# Patient Record
Sex: FEMALE | Race: WHITE | Employment: FULL TIME | URBAN - METROPOLITAN AREA
[De-identification: names, ages, dates, MRNs, and addresses within clinical notes are randomized per-mention and may not be internally consistent; named-entity substitution may affect disease eponyms.]

---

## 2017-01-03 PROBLEM — R01.1 FLOW MURMUR: Chronic | Status: ACTIVE | Noted: 2017-01-03

## 2017-01-03 PROBLEM — Z85.3 HISTORY OF CANCER OF RIGHT BREAST: Chronic | Status: ACTIVE | Noted: 2017-01-03

## 2017-01-03 PROBLEM — I10 ESSENTIAL HYPERTENSION: Chronic | Status: ACTIVE | Noted: 2017-01-03

## 2017-03-14 ENCOUNTER — APPOINTMENT (OUTPATIENT)
Dept: CT IMAGING | Age: 62
End: 2017-03-14
Attending: EMERGENCY MEDICINE
Payer: COMMERCIAL

## 2017-03-14 ENCOUNTER — HOSPITAL ENCOUNTER (EMERGENCY)
Age: 62
Discharge: HOME | End: 2017-03-14
Attending: EMERGENCY MEDICINE
Payer: COMMERCIAL

## 2017-03-14 ENCOUNTER — APPOINTMENT (OUTPATIENT)
Dept: GENERAL RADIOLOGY | Age: 62
End: 2017-03-14
Attending: EMERGENCY MEDICINE
Payer: COMMERCIAL

## 2017-03-14 VITALS
TEMPERATURE: 98 F | HEART RATE: 95 BPM | HEIGHT: 65 IN | WEIGHT: 249 LBS | RESPIRATION RATE: 14 BRPM | SYSTOLIC BLOOD PRESSURE: 125 MMHG | BODY MASS INDEX: 41.48 KG/M2 | DIASTOLIC BLOOD PRESSURE: 74 MMHG | OXYGEN SATURATION: 100 %

## 2017-03-14 DIAGNOSIS — I47.1 SUPRAVENTRICULAR TACHYCARDIA (HCC): Primary | ICD-10-CM

## 2017-03-14 LAB
ALBUMIN SERPL BCP-MCNC: 3.7 G/DL (ref 3.2–4.6)
ALBUMIN/GLOB SERPL: 0.9 {RATIO} (ref 1.2–3.5)
ALP SERPL-CCNC: 122 U/L (ref 50–136)
ALT SERPL-CCNC: 27 U/L (ref 12–65)
ANION GAP BLD CALC-SCNC: 11 MMOL/L (ref 7–16)
AST SERPL W P-5'-P-CCNC: 35 U/L (ref 15–37)
ATRIAL RATE: 145 BPM
BASOPHILS # BLD AUTO: 0 K/UL (ref 0–0.2)
BASOPHILS # BLD: 0 % (ref 0–2)
BILIRUB SERPL-MCNC: 0.4 MG/DL (ref 0.2–1.1)
BUN SERPL-MCNC: 20 MG/DL (ref 8–23)
CALCIUM SERPL-MCNC: 8.7 MG/DL (ref 8.3–10.4)
CALCULATED P AXIS, ECG09: 56 DEGREES
CALCULATED R AXIS, ECG10: 62 DEGREES
CALCULATED T AXIS, ECG11: 24 DEGREES
CHLORIDE SERPL-SCNC: 109 MMOL/L (ref 98–107)
CO2 SERPL-SCNC: 24 MMOL/L (ref 21–32)
CREAT SERPL-MCNC: 1.15 MG/DL (ref 0.6–1)
D DIMER PPP FEU-MCNC: 0.67 UG/ML(FEU)
DIAGNOSIS, 93000: NORMAL
DIASTOLIC BP, ECG02: NORMAL MMHG
DIFFERENTIAL METHOD BLD: ABNORMAL
EOSINOPHIL # BLD: 0.5 K/UL (ref 0–0.8)
EOSINOPHIL NFR BLD: 5 % (ref 0.5–7.8)
ERYTHROCYTE [DISTWIDTH] IN BLOOD BY AUTOMATED COUNT: 13.9 % (ref 11.9–14.6)
GLOBULIN SER CALC-MCNC: 4 G/DL (ref 2.3–3.5)
GLUCOSE SERPL-MCNC: 137 MG/DL (ref 65–100)
HCT VFR BLD AUTO: 40.1 % (ref 35.8–46.3)
HGB BLD-MCNC: 13.4 G/DL (ref 11.7–15.4)
IMM GRANULOCYTES # BLD: 0 K/UL (ref 0–0.5)
IMM GRANULOCYTES NFR BLD AUTO: 0.1 % (ref 0–5)
LYMPHOCYTES # BLD AUTO: 43 % (ref 13–44)
LYMPHOCYTES # BLD: 4.6 K/UL (ref 0.5–4.6)
MCH RBC QN AUTO: 30.6 PG (ref 26.1–32.9)
MCHC RBC AUTO-ENTMCNC: 33.4 G/DL (ref 31.4–35)
MCV RBC AUTO: 91.6 FL (ref 79.6–97.8)
MONOCYTES # BLD: 0.8 K/UL (ref 0.1–1.3)
MONOCYTES NFR BLD AUTO: 7 % (ref 4–12)
NEUTS SEG # BLD: 4.8 K/UL (ref 1.7–8.2)
NEUTS SEG NFR BLD AUTO: 45 % (ref 43–78)
P-R INTERVAL, ECG05: 116 MS
PLATELET # BLD AUTO: 254 K/UL (ref 150–450)
PMV BLD AUTO: 10.5 FL (ref 10.8–14.1)
POTASSIUM SERPL-SCNC: 4 MMOL/L (ref 3.5–5.1)
PROT SERPL-MCNC: 7.7 G/DL (ref 6.3–8.2)
Q-T INTERVAL, ECG07: 326 MS
QRS DURATION, ECG06: 74 MS
QTC CALCULATION (BEZET), ECG08: 506 MS
RBC # BLD AUTO: 4.38 M/UL (ref 4.05–5.25)
SODIUM SERPL-SCNC: 144 MMOL/L (ref 136–145)
SYSTOLIC BP, ECG01: NORMAL MMHG
TROPONIN I SERPL-MCNC: <0.02 NG/ML (ref 0.02–0.05)
VENTRICULAR RATE, ECG03: 145 BPM
WBC # BLD AUTO: 10.7 K/UL (ref 4.3–11.1)

## 2017-03-14 PROCEDURE — 96374 THER/PROPH/DIAG INJ IV PUSH: CPT | Performed by: EMERGENCY MEDICINE

## 2017-03-14 PROCEDURE — 71010 XR CHEST PORT: CPT

## 2017-03-14 PROCEDURE — 84484 ASSAY OF TROPONIN QUANT: CPT | Performed by: EMERGENCY MEDICINE

## 2017-03-14 PROCEDURE — 74011636320 HC RX REV CODE- 636/320: Performed by: EMERGENCY MEDICINE

## 2017-03-14 PROCEDURE — 71260 CT THORAX DX C+: CPT

## 2017-03-14 PROCEDURE — 99284 EMERGENCY DEPT VISIT MOD MDM: CPT | Performed by: EMERGENCY MEDICINE

## 2017-03-14 PROCEDURE — 96361 HYDRATE IV INFUSION ADD-ON: CPT | Performed by: EMERGENCY MEDICINE

## 2017-03-14 PROCEDURE — 74011000258 HC RX REV CODE- 258: Performed by: EMERGENCY MEDICINE

## 2017-03-14 PROCEDURE — 93005 ELECTROCARDIOGRAM TRACING: CPT | Performed by: EMERGENCY MEDICINE

## 2017-03-14 PROCEDURE — 74011250636 HC RX REV CODE- 250/636: Performed by: EMERGENCY MEDICINE

## 2017-03-14 PROCEDURE — 85379 FIBRIN DEGRADATION QUANT: CPT | Performed by: EMERGENCY MEDICINE

## 2017-03-14 PROCEDURE — 80053 COMPREHEN METABOLIC PANEL: CPT | Performed by: EMERGENCY MEDICINE

## 2017-03-14 PROCEDURE — 74011000250 HC RX REV CODE- 250: Performed by: EMERGENCY MEDICINE

## 2017-03-14 PROCEDURE — 85025 COMPLETE CBC W/AUTO DIFF WBC: CPT | Performed by: EMERGENCY MEDICINE

## 2017-03-14 RX ORDER — SODIUM CHLORIDE 0.9 % (FLUSH) 0.9 %
5-10 SYRINGE (ML) INJECTION EVERY 8 HOURS
Status: DISCONTINUED | OUTPATIENT
Start: 2017-03-14 | End: 2017-03-14 | Stop reason: HOSPADM

## 2017-03-14 RX ORDER — SODIUM CHLORIDE 0.9 % (FLUSH) 0.9 %
10 SYRINGE (ML) INJECTION
Status: COMPLETED | OUTPATIENT
Start: 2017-03-14 | End: 2017-03-14

## 2017-03-14 RX ORDER — SODIUM CHLORIDE 0.9 % (FLUSH) 0.9 %
5-10 SYRINGE (ML) INJECTION AS NEEDED
Status: DISCONTINUED | OUTPATIENT
Start: 2017-03-14 | End: 2017-03-14 | Stop reason: HOSPADM

## 2017-03-14 RX ORDER — METOPROLOL TARTRATE 5 MG/5ML
5 INJECTION INTRAVENOUS ONCE
Status: COMPLETED | OUTPATIENT
Start: 2017-03-14 | End: 2017-03-14

## 2017-03-14 RX ADMIN — Medication 5 ML: at 15:43

## 2017-03-14 RX ADMIN — SODIUM CHLORIDE 1000 ML: 900 INJECTION, SOLUTION INTRAVENOUS at 16:33

## 2017-03-14 RX ADMIN — SODIUM CHLORIDE 100 ML: 900 INJECTION, SOLUTION INTRAVENOUS at 18:20

## 2017-03-14 RX ADMIN — METOPROLOL TARTRATE 5 MG: 1 INJECTION, SOLUTION INTRAVENOUS at 16:33

## 2017-03-14 RX ADMIN — IOVERSOL 100 ML: 741 INJECTION INTRA-ARTERIAL; INTRAVENOUS at 18:20

## 2017-03-14 RX ADMIN — Medication 10 ML: at 18:21

## 2017-03-14 NOTE — ED TRIAGE NOTES
Pt. Presented to the wellness clinic with generalized body aches. While there, the np states the pt. HR went from 90 to 150. Pt. States she had an episode similar last year with a viral illness of which she temporarily went into afib.  States she did not see cardiology at that time but went to her family MD.

## 2017-03-14 NOTE — DISCHARGE INSTRUCTIONS
Return with any fevers, vomiting, difficulty breathing, worsening symptoms, or additional concerns. As we discussed, I think he should restart your metoprolol.     Follow-up with your primary care doctor for reevaluation in 2 or 3 days

## 2017-03-20 PROBLEM — R00.0 SINUS TACHYCARDIA: Chronic | Status: ACTIVE | Noted: 2017-03-20

## 2017-06-13 ENCOUNTER — HOSPITAL ENCOUNTER (OUTPATIENT)
Dept: MAMMOGRAPHY | Age: 62
Discharge: HOME | End: 2017-06-13
Attending: FAMILY MEDICINE
Payer: COMMERCIAL

## 2017-06-13 DIAGNOSIS — Z12.31 VISIT FOR SCREENING MAMMOGRAM: ICD-10-CM

## 2017-06-13 PROCEDURE — 77067 SCR MAMMO BI INCL CAD: CPT

## 2017-07-26 PROBLEM — R73.02 GLUCOSE INTOLERANCE (IMPAIRED GLUCOSE TOLERANCE): Status: ACTIVE | Noted: 2017-07-26

## 2017-08-03 PROBLEM — R73.02 GLUCOSE INTOLERANCE (IMPAIRED GLUCOSE TOLERANCE): Chronic | Status: ACTIVE | Noted: 2017-07-26

## 2018-02-07 PROBLEM — K21.9 GASTROESOPHAGEAL REFLUX DISEASE: Chronic | Status: ACTIVE | Noted: 2018-02-07

## 2018-08-07 PROBLEM — E66.01 SEVERE OBESITY (BMI >= 40) (HCC): Chronic | Status: ACTIVE | Noted: 2018-08-07

## 2018-08-21 ENCOUNTER — HOSPITAL ENCOUNTER (OUTPATIENT)
Dept: MAMMOGRAPHY | Age: 63
Discharge: HOME | End: 2018-08-21
Attending: FAMILY MEDICINE
Payer: COMMERCIAL

## 2018-08-21 DIAGNOSIS — Z12.31 VISIT FOR SCREENING MAMMOGRAM: ICD-10-CM

## 2018-08-21 PROCEDURE — 77067 SCR MAMMO BI INCL CAD: CPT

## 2020-07-17 ENCOUNTER — EMPLOYEE WELLNESS (OUTPATIENT)
Dept: OTHER | Facility: CLINIC | Age: 65
End: 2020-07-17

## 2021-08-12 NOTE — ED PROVIDER NOTES
HPI Comments: 63-year-old lady with a history of occasional sinus tachycardia who comes in with concerns about an irregular heartbeat earlier today and some body aches. Patient said she felt like she had gotten the flu. Patient said all this started suddenly this afternoon. She denied any previous fevers or chills and said yesterday she was feeling fine. Patient said she hasn't had a problem with tachycardia since December. She denies any vomiting or diarrhea. Patient said she had no significant difficulty breathing and no significant chest pain with these episodes. Patient is a 63 y.o. female presenting with general illness and palpitations. The history is provided by the patient. Generalized Body Aches   Pertinent negatives include no chest pain, no abdominal pain, no headaches and no shortness of breath. Palpitations    Pertinent negatives include no diaphoresis, no fever, no chest pain, no abdominal pain, no nausea, no vomiting, no headaches, no dizziness, no weakness, no cough and no shortness of breath. Past Medical History:   Diagnosis Date    Breast cancer ([facility] [address] 2000    right    Gastrointestinal disorder     Hypertension     Radiation therapy complication 6218       Past Surgical History:   Procedure Laterality Date    HX BREAST LUMPECTOMY Right 2000    radiation     HX CHOLECYSTECTOMY  1985    HX LUMBAR LAMINECTOMY      HX NICCI AND BSO      age 23    HX TYMPANOSTOMY  2013         Family History:   Problem Relation Age of Onset    Breast Cancer Paternal Grandmother 79       Social History     Social History    Marital status:      Spouse name: N/A    Number of children: N/A    Years of education: N/A     Occupational History    Not on file.      Social History Main Topics    Smoking status: Never Smoker    Smokeless tobacco: Never Used    Alcohol use No    Drug use: No    Sexual activity: Not on file     Other Topics Concern    Not on file     Social History Narrative         ALLERGIES: Aspirin    Review of Systems   Constitutional: Negative for chills, diaphoresis and fever. HENT: Negative for congestion, rhinorrhea and sore throat. Eyes: Negative for redness and visual disturbance. Respiratory: Negative for cough, chest tightness, shortness of breath and wheezing. Cardiovascular: Positive for palpitations. Negative for chest pain. Gastrointestinal: Negative for abdominal pain, blood in stool, diarrhea, nausea and vomiting. Endocrine: Negative for polydipsia and polyuria. Genitourinary: Negative for dysuria and hematuria. Musculoskeletal: Positive for myalgias. Negative for arthralgias and neck stiffness. Skin: Negative for rash. Allergic/Immunologic: Negative for environmental allergies and food allergies. Neurological: Negative for dizziness, weakness and headaches. Hematological: Negative for adenopathy. Does not bruise/bleed easily. Psychiatric/Behavioral: Negative for confusion and sleep disturbance. The patient is not nervous/anxious. Vitals:    03/14/17 1503 03/14/17 1744   BP: 151/84 123/59   Pulse: (!) 144 93   Resp: 16 13   Temp: 97.8 °F (36.6 °C)    SpO2: 100% 100%   Weight: 112.9 kg (249 lb)    Height: 5' 5\" (1.651 m)             Physical Exam   Constitutional: She is oriented to person, place, and time. She appears well-developed and well-nourished. HENT:   Head: Normocephalic and atraumatic. Eyes: Conjunctivae and EOM are normal. Pupils are equal, round, and reactive to light. Neck: Normal range of motion. Cardiovascular: Regular rhythm. Sinus tach in the 140s   Pulmonary/Chest: Effort normal and breath sounds normal. No respiratory distress. She has no wheezes. She has no rales. She exhibits no tenderness. Abdominal: Soft. Bowel sounds are normal. There is no rebound and no guarding. Musculoskeletal: Normal range of motion. She exhibits no edema or tenderness.    Lymphadenopathy:     She has no cervical adenopathy. Neurological: She is alert and oriented to person, place, and time. Skin: Skin is warm and dry. Psychiatric: She has a normal mood and affect. Nursing note and vitals reviewed. MDM  Number of Diagnoses or Management Options  Diagnosis management comments: EKG showed sinus tachycardia. Chest x-ray was essentially unremarkable. Her troponin was negative but her d-dimer was positive I will get a CTA of her chest to look for PE. CTA was negative. Patient received a prescription for metoprolol in December she has not been taking it. I will encourage her to restart that metoprolol.     ED Course       Procedures no

## 2022-03-18 PROBLEM — R00.0 SINUS TACHYCARDIA: Status: ACTIVE | Noted: 2017-03-20

## 2022-03-18 PROBLEM — R73.02 GLUCOSE INTOLERANCE (IMPAIRED GLUCOSE TOLERANCE): Status: ACTIVE | Noted: 2017-07-26

## 2022-03-19 PROBLEM — I10 ESSENTIAL HYPERTENSION: Status: ACTIVE | Noted: 2017-01-03

## 2022-03-19 PROBLEM — R01.1 FLOW MURMUR: Status: ACTIVE | Noted: 2017-01-03

## 2022-03-19 PROBLEM — K21.9 GASTROESOPHAGEAL REFLUX DISEASE: Status: ACTIVE | Noted: 2018-02-07

## 2022-03-20 PROBLEM — E66.01 SEVERE OBESITY (BMI >= 40) (HCC): Status: ACTIVE | Noted: 2018-08-07

## 2022-03-20 PROBLEM — Z85.3 HISTORY OF CANCER OF RIGHT BREAST: Status: ACTIVE | Noted: 2017-01-03

## 2022-08-23 LAB
CHOLEST SERPL-MCNC: 132 MG/DL
GLUCOSE SERPL-MCNC: 98 MG/DL (ref 65–100)
HDLC SERPL-MCNC: 40 MG/DL (ref 40–60)
LDLC SERPL CALC-MCNC: 72.4 MG/DL
TRIGL SERPL-MCNC: 98 MG/DL (ref 35–150)

## 2022-11-02 ENCOUNTER — TELEPHONE (OUTPATIENT)
Dept: FAMILY MEDICINE CLINIC | Facility: CLINIC | Age: 67
End: 2022-11-02

## 2022-11-02 DIAGNOSIS — K21.9 GASTROESOPHAGEAL REFLUX DISEASE, UNSPECIFIED WHETHER ESOPHAGITIS PRESENT: Primary | ICD-10-CM

## 2022-11-02 RX ORDER — LANSOPRAZOLE 30 MG/1
30 CAPSULE, DELAYED RELEASE ORAL
Qty: 90 CAPSULE | Refills: 3 | Status: SHIPPED | OUTPATIENT
Start: 2022-11-02 | End: 2023-01-31

## 2022-11-02 NOTE — TELEPHONE ENCOUNTER
Patient needs 30 days on the lansoprazole to [facility] mail order to cover until her NTP appt

## 2022-11-17 SDOH — HEALTH STABILITY: PHYSICAL HEALTH: ON AVERAGE, HOW MANY MINUTES DO YOU ENGAGE IN EXERCISE AT THIS LEVEL?: 30 MIN

## 2022-11-17 SDOH — HEALTH STABILITY: PHYSICAL HEALTH: ON AVERAGE, HOW MANY DAYS PER WEEK DO YOU ENGAGE IN MODERATE TO STRENUOUS EXERCISE (LIKE A BRISK WALK)?: 3 DAYS

## 2022-11-18 ENCOUNTER — OFFICE VISIT (OUTPATIENT)
Dept: FAMILY MEDICINE CLINIC | Facility: CLINIC | Age: 67
End: 2022-11-18
Payer: COMMERCIAL

## 2022-11-18 VITALS
OXYGEN SATURATION: 99 % | SYSTOLIC BLOOD PRESSURE: 125 MMHG | TEMPERATURE: 98.2 F | HEART RATE: 87 BPM | WEIGHT: 267.4 LBS | HEIGHT: 65 IN | BODY MASS INDEX: 44.55 KG/M2 | DIASTOLIC BLOOD PRESSURE: 85 MMHG

## 2022-11-18 DIAGNOSIS — E66.01 SEVERE OBESITY (BMI >= 40) (HCC): ICD-10-CM

## 2022-11-18 DIAGNOSIS — I10 ESSENTIAL (PRIMARY) HYPERTENSION: ICD-10-CM

## 2022-11-18 DIAGNOSIS — Z13.820 SCREENING FOR OSTEOPOROSIS: ICD-10-CM

## 2022-11-18 DIAGNOSIS — R73.03 PREDIABETES: ICD-10-CM

## 2022-11-18 DIAGNOSIS — R00.0 TACHYCARDIA: ICD-10-CM

## 2022-11-18 DIAGNOSIS — I48.91 ATRIAL FIBRILLATION, UNSPECIFIED TYPE (HCC): Primary | ICD-10-CM

## 2022-11-18 DIAGNOSIS — Z85.3 HISTORY OF CANCER OF RIGHT BREAST: ICD-10-CM

## 2022-11-18 DIAGNOSIS — K21.9 GASTROESOPHAGEAL REFLUX DISEASE WITHOUT ESOPHAGITIS: ICD-10-CM

## 2022-11-18 DIAGNOSIS — Z78.0 MENOPAUSE: ICD-10-CM

## 2022-11-18 PROBLEM — Z12.31 ENCOUNTER FOR SCREENING MAMMOGRAM FOR MALIGNANT NEOPLASM OF BREAST: Status: ACTIVE | Noted: 2022-11-18

## 2022-11-18 LAB
ALBUMIN SERPL-MCNC: 4.2 G/DL (ref 3.2–4.6)
ALBUMIN/GLOB SERPL: 1.2 {RATIO} (ref 0.4–1.6)
ALP SERPL-CCNC: 110 U/L (ref 50–136)
ALT SERPL-CCNC: 31 U/L (ref 12–65)
ANION GAP SERPL CALC-SCNC: 4 MMOL/L (ref 2–11)
AST SERPL-CCNC: 18 U/L (ref 15–37)
BASOPHILS # BLD: 0.1 K/UL (ref 0–0.2)
BASOPHILS NFR BLD: 1 % (ref 0–2)
BILIRUB SERPL-MCNC: 0.5 MG/DL (ref 0.2–1.1)
BUN SERPL-MCNC: 27 MG/DL (ref 8–23)
CALCIUM SERPL-MCNC: 9.6 MG/DL (ref 8.3–10.4)
CHLORIDE SERPL-SCNC: 111 MMOL/L (ref 101–110)
CO2 SERPL-SCNC: 25 MMOL/L (ref 21–32)
CREAT SERPL-MCNC: 1.2 MG/DL (ref 0.6–1)
DIFFERENTIAL METHOD BLD: NORMAL
EOSINOPHIL # BLD: 0.1 K/UL (ref 0–0.8)
EOSINOPHIL NFR BLD: 2 % (ref 0.5–7.8)
ERYTHROCYTE [DISTWIDTH] IN BLOOD BY AUTOMATED COUNT: 13.4 % (ref 11.9–14.6)
EST. AVERAGE GLUCOSE BLD GHB EST-MCNC: 114 MG/DL
GLOBULIN SER CALC-MCNC: 3.4 G/DL (ref 2.8–4.5)
GLUCOSE SERPL-MCNC: 133 MG/DL (ref 65–100)
HBA1C MFR BLD: 5.6 % (ref 4.8–5.6)
HCT VFR BLD AUTO: 42.7 % (ref 35.8–46.3)
HGB BLD-MCNC: 13.6 G/DL (ref 11.7–15.4)
IMM GRANULOCYTES # BLD AUTO: 0 K/UL (ref 0–0.5)
IMM GRANULOCYTES NFR BLD AUTO: 0 % (ref 0–5)
LYMPHOCYTES # BLD: 1.9 K/UL (ref 0.5–4.6)
LYMPHOCYTES NFR BLD: 27 % (ref 13–44)
MCH RBC QN AUTO: 30.5 PG (ref 26.1–32.9)
MCHC RBC AUTO-ENTMCNC: 31.9 G/DL (ref 31.4–35)
MCV RBC AUTO: 95.7 FL (ref 82–102)
MONOCYTES # BLD: 0.6 K/UL (ref 0.1–1.3)
MONOCYTES NFR BLD: 8 % (ref 4–12)
NEUTS SEG # BLD: 4.4 K/UL (ref 1.7–8.2)
NEUTS SEG NFR BLD: 62 % (ref 43–78)
NRBC # BLD: 0 K/UL (ref 0–0.2)
PLATELET # BLD AUTO: 209 K/UL (ref 150–450)
PMV BLD AUTO: 10.8 FL (ref 9.4–12.3)
POTASSIUM SERPL-SCNC: 4 MMOL/L (ref 3.5–5.1)
PROT SERPL-MCNC: 7.6 G/DL (ref 6.3–8.2)
RBC # BLD AUTO: 4.46 M/UL (ref 4.05–5.2)
SODIUM SERPL-SCNC: 140 MMOL/L (ref 133–143)
WBC # BLD AUTO: 7.1 K/UL (ref 4.3–11.1)

## 2022-11-18 PROCEDURE — 99215 OFFICE O/P EST HI 40 MIN: CPT | Performed by: FAMILY MEDICINE

## 2022-11-18 PROCEDURE — 93000 ELECTROCARDIOGRAM COMPLETE: CPT | Performed by: FAMILY MEDICINE

## 2022-11-18 PROCEDURE — 3078F DIAST BP <80 MM HG: CPT | Performed by: FAMILY MEDICINE

## 2022-11-18 PROCEDURE — 1123F ACP DISCUSS/DSCN MKR DOCD: CPT | Performed by: FAMILY MEDICINE

## 2022-11-18 PROCEDURE — 3074F SYST BP LT 130 MM HG: CPT | Performed by: FAMILY MEDICINE

## 2022-11-18 RX ORDER — HYDROCHLOROTHIAZIDE 12.5 MG/1
12.5 TABLET ORAL DAILY
Qty: 90 TABLET | Refills: 1 | Status: SHIPPED | OUTPATIENT
Start: 2022-11-18

## 2022-11-18 RX ORDER — AMLODIPINE AND VALSARTAN 10; 160 MG/1; MG/1
1 TABLET ORAL DAILY
Qty: 90 TABLET | Refills: 1 | Status: SHIPPED | OUTPATIENT
Start: 2022-11-18

## 2022-11-18 RX ORDER — METOPROLOL SUCCINATE 50 MG/1
50 TABLET, EXTENDED RELEASE ORAL DAILY
Qty: 90 TABLET | Refills: 1 | Status: SHIPPED | OUTPATIENT
Start: 2022-11-18

## 2022-11-18 ASSESSMENT — ENCOUNTER SYMPTOMS
DIARRHEA: 0
NAUSEA: 0
BLOOD IN STOOL: 0
COUGH: 0
SHORTNESS OF BREATH: 0
CHEST TIGHTNESS: 0
ABDOMINAL PAIN: 0
ABDOMINAL DISTENTION: 0
CONSTIPATION: 0
BACK PAIN: 0
VOMITING: 0

## 2022-11-18 ASSESSMENT — PATIENT HEALTH QUESTIONNAIRE - PHQ9
SUM OF ALL RESPONSES TO PHQ QUESTIONS 1-9: 0
1. LITTLE INTEREST OR PLEASURE IN DOING THINGS: 0
2. FEELING DOWN, DEPRESSED OR HOPELESS: 0
SUM OF ALL RESPONSES TO PHQ QUESTIONS 1-9: 0
SUM OF ALL RESPONSES TO PHQ9 QUESTIONS 1 & 2: 0

## 2022-11-18 ASSESSMENT — ANXIETY QUESTIONNAIRES
3. WORRYING TOO MUCH ABOUT DIFFERENT THINGS: 0
1. FEELING NERVOUS, ANXIOUS, OR ON EDGE: 0
5. BEING SO RESTLESS THAT IT IS HARD TO SIT STILL: 0
IF YOU CHECKED OFF ANY PROBLEMS ON THIS QUESTIONNAIRE, HOW DIFFICULT HAVE THESE PROBLEMS MADE IT FOR YOU TO DO YOUR WORK, TAKE CARE OF THINGS AT HOME, OR GET ALONG WITH OTHER PEOPLE: NOT DIFFICULT AT ALL
6. BECOMING EASILY ANNOYED OR IRRITABLE: 0
GAD7 TOTAL SCORE: 0
2. NOT BEING ABLE TO STOP OR CONTROL WORRYING: 0
7. FEELING AFRAID AS IF SOMETHING AWFUL MIGHT HAPPEN: 0
4. TROUBLE RELAXING: 0

## 2022-11-18 NOTE — PROGRESS NOTES
[facility]  Alecia Andre  Phone [phone]  Fax:  [phone]    Patient: Amilcar Abrams  YOB: 1955  Patient Age 74 y.o. Patient sex: female  Medical Record:  [id number]  Visit Date: 11/18/22    Family Practice Clinic Note     Chief Complaint   Patient presents with    Medication Refill       History of Present Illness:  Amilcar Abrams is a 74 y.o. female who presents today for annual follow-up of hypertension, which is currrently elevated. Normally about 120/70 at  home. Always elevated here. 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 she denies CP, JOHNSON/SOB, lower extremity edema, dizziness, syncopal episodes, tobacco use, regular ETOH use, hx of MI or CVA. She has a history of SVT- monitors HR at home, which is normally around 80. Hysterectomy at age 23 - had polycystic ovaries. Had Zack/bso early. Had HRT till 2000 when developed breast cancer. Was hormone related. Does try and eat Ca rich foods. Mostly diary. Breast cancer - R breast lumpectomy - [place] survivorship [place].  It is thought that her cancer was caused by her estrogen therapy after hysterectomy at age 23. She is off all meds now. Overdue for mammogram.  I did discuss with her the need to get her screening done yearly. It is almost 2 years now since she had her last mammogram.  Grandmother with brca but at age [de-identified]. Obesity - diet is good - trying to limit portions and carbs. Has lost some wt per pt. NO exercise. Discussed need to walk    GERD-she is taking Prevacid and that seems to help control her acid reflux. She does take it daily. Medication Refill  Pertinent negatives include no abdominal pain, chest pain, coughing, headaches, myalgias, nausea or vomiting. Allergies:   Allergies   Allergen Reactions    Aspirin Other (See Comments)     abd pain       Current Medications:   Medications marked \"taking\" at this time:    Current Outpatient Medications:     amLODIPine-valsartan (EXFORGE)  MG per tablet, Take 1 tablet by mouth daily, Disp: 90 tablet, Rfl: 1    hydroCHLOROthiazide (HYDRODIURIL) 12.5 MG tablet, Take 1 tablet by mouth daily, Disp: 90 tablet, Rfl: 1    metoprolol succinate (TOPROL XL) 50 MG extended release tablet, Take 1 tablet by mouth daily, Disp: 90 tablet, Rfl: 1    apixaban (ELIQUIS) 5 MG TABS tablet, Take 1 tablet by mouth 2 times daily, Disp: 180 tablet, Rfl: 1    lansoprazole (PREVACID) 30 MG delayed release capsule, Take 1 capsule by mouth every morning (before breakfast), Disp: 90 capsule, Rfl: 3    Current Problem List:   Patient Active Problem List   Diagnosis    Glucose intolerance (impaired glucose tolerance)    Tachycardia    Essential (primary) hypertension    Flow murmur    BMI 40.0-44.9, adult (HCC)    Gastroesophageal reflux disease    Severe obesity (BMI >= 40) (HCC)    History of cancer of right breast    Encounter for screening mammogram for malignant neoplasm of breast    Menopause    Prediabetes    Atrial fibrillation (HCC)       Social History:   Social History     Tobacco Use    Smoking status: Never    Smokeless tobacco: Never   Substance Use Topics    Alcohol use: No       Family History:   Family History   Problem Relation Age of Onset    Breast Cancer Paternal Grandmother 79    Hypertension Father     No Known Problems Mother     No Known Problems Paternal Grandfather     No Known Problems Maternal Grandfather     No Known Problems Maternal Grandmother        Surgical History:  Past Surgical History:   Procedure Laterality Date    BREAST LUMPECTOMY Right 2000    radiation     CHOLECYSTECTOMY  1985    LUMBAR LAMINECTOMY      NICKY AND BSO (CERVIX REMOVED)      age 23    TYMPANOSTOMY TUBE PLACEMENT  2013       ROS  Review of Systems   Constitutional: Negative. HENT: Negative. Respiratory:  Negative for cough, chest tightness and shortness of breath.     Cardiovascular: Negative for chest pain, palpitations and leg swelling. Gastrointestinal:  Negative for abdominal distention, abdominal pain, blood in stool, constipation, diarrhea, nausea and vomiting. Endocrine: Negative for polyuria. Genitourinary:  Negative for dysuria, frequency, hematuria and urgency. Musculoskeletal:  Negative for back pain, gait problem and myalgias. Skin: Negative. Allergic/Immunologic: Negative for environmental allergies. Neurological:  Negative for dizziness and headaches. Hematological: Negative. Psychiatric/Behavioral:  Negative for behavioral problems and sleep disturbance. Visit Vitals  /85   Pulse 87   Temp 98.2 °F (36.8 °C)   Ht 5' 5\" (1.651 m)   Wt 267 lb 6.4 oz (121.3 kg)   SpO2 99%   BMI 44.50 kg/m²     125/85    Physical Exam  Physical Exam  Constitutional:       Appearance: Normal appearance. She is normal weight. HENT:      Head: Normocephalic and atraumatic. Right Ear: Tympanic membrane normal.      Left Ear: Tympanic membrane normal.      Nose: Nose normal.   Eyes:      Pupils: Pupils are equal, round, and reactive to light. Neck:      Vascular: No carotid bruit. Cardiovascular:      Rate and Rhythm: Tachycardia present. Rhythm irregular. Heart sounds: Normal heart sounds. No murmur heard. Pulmonary:      Effort: Pulmonary effort is normal.      Breath sounds: Normal breath sounds. Abdominal:      General: Abdomen is flat. Bowel sounds are normal.      Palpations: Abdomen is soft. Musculoskeletal:         General: No swelling. Normal range of motion. Cervical back: Normal range of motion and neck supple. Skin:     General: Skin is warm and dry. Neurological:      General: No focal deficit present. Mental Status: She is alert and oriented to person, place, and time. Psychiatric:         Mood and Affect: Mood normal.     EKG -atrial fibrillation with a rate of 156.     ASSESSMENT & PLAN      I have reviewed the patient's past medical history, social history and family history and vitals. We have discussed treatment plan and follow up and given patient instructions. Patient's questions are answered and we will follow up as indicated. Liana Myers was seen today for medication refill. Diagnoses and all orders for this visit:    [facility] fibrillation, unspecified type [facility] -new onset A. fib. Patient is asymptomatic. She currently is on a beta-blocker. I did give her samples of Eliquis for 28 days - 5 mg twice a day. I did discuss with her the risk of bleeding when taking this medication. If she is to fall and hit her head then is an ER visit to make sure she does not have a bleed. Cardiology referral was placed for her. I discussed treatment of atrial fibrillation including cardioversion and ablation. I did discuss her risk of stroke and the need for anticoagulation to prevent stroke. -     apixaban (ELIQUIS) 5 MG TABS tablet; Take 1 tablet by mouth 2 times daily  -     Cody Cha Dr  -     CBC with Auto Differential; Future  -     CBC with Auto Differential    Prediabetes -we will check her sugars today. Diet was discussed including a low-fat low-carb diet. It is recommended that she do 30 minutes of exercise a day. I discussed walking daily.  -     Hemoglobin A1C; Future  -     Hemoglobin A1C    Essential (primary) hypertension -blood pressure is better on recheck. She is well controlled at home and recheck normal here. Recommended low-salt diet and increasing her exercise. Weight loss will also help. -     amLODIPine-valsartan (EXFORGE)  MG per tablet; Take 1 tablet by mouth daily  -     hydroCHLOROthiazide (HYDRODIURIL) 12.5 MG tablet; Take 1 tablet by mouth daily  -     metoprolol succinate (TOPROL XL) 50 MG extended release tablet; Take 1 tablet by mouth daily  -     Comprehensive Metabolic Panel;  Future  -     Comprehensive Metabolic Panel    Screening for osteoporosis -patient had early menopause at age 23 due to NICKY/BSO.  -     DEXA BONE DENSITY AXIAL SKELETON; Future    Menopause -early menopause-discussed calcium intake and prefer that she gets it through her diet. Handout was given to her on foods high in calcium. I did recommend she start vitamin D 1000 units daily. Exercising 30 minutes a day-walking-will benefit her bones. She is at risk given her early menopause  -     DEXA BONE DENSITY AXIAL SKELETON; Future    Tachycardia -due to atrial fibrillation. She currently is on a beta-blocker. She will be referred to cardiology for evaluation and further medication.  -     EKG 12 Lead; Future  -     EKG 12 Lead    History of cancer of right breast -patient is in survivorship program with oncology. She is doing well. She is overdue for her mammogram and I discussed this with her. Discussed that she does need to have this done yearly. An order was placed for her. Severe obesity (BMI >= 40) (formerly [facility]) -discussed diet and weight loss. Recommend portion control and increasing her exercise. Recommend limiting her carbs and sweets    Gastroesophageal reflux disease without esophagitis -taking Prevacid and is well controlled on meds. Other orders  -     AGUSTÍN DIGITAL SCREEN W OR WO CAD BILATERAL; Future       Return in about 6 months (around 5/18/2023) for 6 mos f/u. On this date 11/18/2022 I have spent 50 minutes reviewing previous notes, test results and face to face with the patient discussing the diagnosis and importance of compliance with the treatment plan as well as documenting on the day of the visit.     Dayanara Forrest MD

## 2022-12-18 PROBLEM — Z12.31 ENCOUNTER FOR SCREENING MAMMOGRAM FOR MALIGNANT NEOPLASM OF BREAST: Status: RESOLVED | Noted: 2022-11-18 | Resolved: 2022-12-18

## 2023-05-18 ENCOUNTER — OFFICE VISIT (OUTPATIENT)
Dept: FAMILY MEDICINE CLINIC | Facility: CLINIC | Age: 68
End: 2023-05-18
Payer: COMMERCIAL

## 2023-05-18 VITALS
DIASTOLIC BLOOD PRESSURE: 80 MMHG | OXYGEN SATURATION: 98 % | HEIGHT: 65 IN | TEMPERATURE: 97.8 F | SYSTOLIC BLOOD PRESSURE: 130 MMHG | WEIGHT: 269.6 LBS | HEART RATE: 120 BPM | BODY MASS INDEX: 44.92 KG/M2

## 2023-05-18 DIAGNOSIS — E78.2 MIXED HYPERLIPIDEMIA: ICD-10-CM

## 2023-05-18 DIAGNOSIS — R73.03 PREDIABETES: ICD-10-CM

## 2023-05-18 DIAGNOSIS — I48.91 ATRIAL FIBRILLATION, UNSPECIFIED TYPE (HCC): Primary | ICD-10-CM

## 2023-05-18 DIAGNOSIS — Z78.0 MENOPAUSE: ICD-10-CM

## 2023-05-18 DIAGNOSIS — I10 ESSENTIAL (PRIMARY) HYPERTENSION: ICD-10-CM

## 2023-05-18 DIAGNOSIS — K21.9 GASTROESOPHAGEAL REFLUX DISEASE, UNSPECIFIED WHETHER ESOPHAGITIS PRESENT: ICD-10-CM

## 2023-05-18 DIAGNOSIS — E66.01 SEVERE OBESITY (BMI >= 40) (HCC): ICD-10-CM

## 2023-05-18 PROCEDURE — 99214 OFFICE O/P EST MOD 30 MIN: CPT | Performed by: FAMILY MEDICINE

## 2023-05-18 PROCEDURE — 3075F SYST BP GE 130 - 139MM HG: CPT | Performed by: FAMILY MEDICINE

## 2023-05-18 PROCEDURE — 3079F DIAST BP 80-89 MM HG: CPT | Performed by: FAMILY MEDICINE

## 2023-05-18 PROCEDURE — 1123F ACP DISCUSS/DSCN MKR DOCD: CPT | Performed by: FAMILY MEDICINE

## 2023-05-18 RX ORDER — METOPROLOL SUCCINATE 50 MG/1
50 TABLET, EXTENDED RELEASE ORAL DAILY
Qty: 90 TABLET | Refills: 1 | Status: CANCELLED | OUTPATIENT
Start: 2023-05-18

## 2023-05-18 RX ORDER — AMLODIPINE AND VALSARTAN 10; 160 MG/1; MG/1
1 TABLET ORAL DAILY
Qty: 90 TABLET | Refills: 3 | Status: SHIPPED | OUTPATIENT
Start: 2023-05-18

## 2023-05-18 RX ORDER — LANSOPRAZOLE 30 MG/1
30 CAPSULE, DELAYED RELEASE ORAL
Qty: 90 CAPSULE | Refills: 3 | Status: SHIPPED | OUTPATIENT
Start: 2023-05-18 | End: 2023-08-16

## 2023-05-18 RX ORDER — METOPROLOL SUCCINATE 100 MG/1
100 TABLET, EXTENDED RELEASE ORAL DAILY
Qty: 90 TABLET | Refills: 3 | Status: SHIPPED | OUTPATIENT
Start: 2023-05-18

## 2023-05-18 RX ORDER — HYDROCHLOROTHIAZIDE 12.5 MG/1
12.5 TABLET ORAL DAILY
Qty: 90 TABLET | Refills: 1 | Status: SHIPPED | OUTPATIENT
Start: 2023-05-18

## 2023-05-18 SDOH — ECONOMIC STABILITY: HOUSING INSECURITY
IN THE LAST 12 MONTHS, WAS THERE A TIME WHEN YOU DID NOT HAVE A STEADY PLACE TO SLEEP OR SLEPT IN A SHELTER (INCLUDING NOW)?: NO

## 2023-05-18 SDOH — ECONOMIC STABILITY: INCOME INSECURITY: HOW HARD IS IT FOR YOU TO PAY FOR THE VERY BASICS LIKE FOOD, HOUSING, MEDICAL CARE, AND HEATING?: NOT HARD AT ALL

## 2023-05-18 SDOH — ECONOMIC STABILITY: FOOD INSECURITY: WITHIN THE PAST 12 MONTHS, THE FOOD YOU BOUGHT JUST DIDN'T LAST AND YOU DIDN'T HAVE MONEY TO GET MORE.: NEVER TRUE

## 2023-05-18 SDOH — ECONOMIC STABILITY: FOOD INSECURITY: WITHIN THE PAST 12 MONTHS, YOU WORRIED THAT YOUR FOOD WOULD RUN OUT BEFORE YOU GOT MONEY TO BUY MORE.: NEVER TRUE

## 2023-05-18 ASSESSMENT — PATIENT HEALTH QUESTIONNAIRE - PHQ9
SUM OF ALL RESPONSES TO PHQ QUESTIONS 1-9: 0
SUM OF ALL RESPONSES TO PHQ QUESTIONS 1-9: 0
2. FEELING DOWN, DEPRESSED OR HOPELESS: 0
SUM OF ALL RESPONSES TO PHQ9 QUESTIONS 1 & 2: 0
1. LITTLE INTEREST OR PLEASURE IN DOING THINGS: 0
SUM OF ALL RESPONSES TO PHQ QUESTIONS 1-9: 0
SUM OF ALL RESPONSES TO PHQ QUESTIONS 1-9: 0

## 2023-05-18 ASSESSMENT — ENCOUNTER SYMPTOMS
SHORTNESS OF BREATH: 0
BACK PAIN: 0
CONSTIPATION: 0
BLOOD IN STOOL: 0
COUGH: 0
CHEST TIGHTNESS: 0
DIARRHEA: 0
ABDOMINAL PAIN: 0

## 2023-05-18 ASSESSMENT — ANXIETY QUESTIONNAIRES
6. BECOMING EASILY ANNOYED OR IRRITABLE: 0
3. WORRYING TOO MUCH ABOUT DIFFERENT THINGS: 0
4. TROUBLE RELAXING: 0
5. BEING SO RESTLESS THAT IT IS HARD TO SIT STILL: 0
1. FEELING NERVOUS, ANXIOUS, OR ON EDGE: 0
2. NOT BEING ABLE TO STOP OR CONTROL WORRYING: 0
7. FEELING AFRAID AS IF SOMETHING AWFUL MIGHT HAPPEN: 0
GAD7 TOTAL SCORE: 0

## 2023-05-18 NOTE — PATIENT INSTRUCTIONS
Referral to Cardiology   _ call if you do not hear about scheduling   Mammogram and Bone density due. Get your bone density scheduled  Toprol xl - increase to 100 mg daily -  Call if any problems. May make you tired.

## 2023-05-18 NOTE — PROGRESS NOTES
[facility]  Alecia Andre  Phone [phone]  Fax:  [phone]    Patient: Bee Ramsey  YOB: 1955  Patient Age 79 y.o. Patient sex: female  Medical Record:  [id number]  Visit Date: 05/18/23    Family Practice Clinic Note     Chief Complaint   Patient presents with    Follow-up     6 month       History of Present Illness:  Bee Ramsey is a 79 y.o. female who presents today for follow-up of hypertension, which is currrently elevated. Normally about 120/70 at  home. Always elevated here. 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 she denies CP, JOHNSON/SOB,  dizziness, syncopal episodes, tobacco use, regular ETOH use, hx of MI or CVA. She has a history of SVT- monitors HR at home, which is normally around 80. This am here it is 120. Afib - on eliquis. Tolerating. A referral was placed to cardiology at her last visit but apparently she was never scheduled. I discussed with her that her heart rate is very high and that she needs to be seen by cardiology to evaluate for an ablation. She is asymptomatic. She denies any shortness of breath. She does have some minor swelling in her lower extremities. Hysterectomy at age 23 - had polycystic ovaries. Had Zack/bso early. Had HRT till 2000 when developed breast cancer. Was hormone related. Does try and eat Ca rich foods. Mostly diary. She was to get a bone density and has not scheduled that yet. Discussed importance of getting the bone density exam done given her long history of early menopause. Breast cancer - R breast lumpectomy - [place] survivorship program.  It is thought that her cancer was caused by her estrogen therapy after hysterectomy at age 23. She is off all meds now. Overdue for mammogram.  I did discuss with her the need to get her screening done yearly.   It is almost 2 years now since she had her last mammogram.  Zeke Flaherty

## 2023-07-27 ENCOUNTER — COMMUNITY OUTREACH (OUTPATIENT)
Dept: FAMILY MEDICINE CLINIC | Facility: CLINIC | Age: 68
End: 2023-07-27

## 2023-07-27 NOTE — PROGRESS NOTES
Patient's  shows they are overdue for Mammogram, Colonoscopy   CareEverywhere and  files searched  without success.

## 2023-11-01 DIAGNOSIS — I10 ESSENTIAL (PRIMARY) HYPERTENSION: ICD-10-CM

## 2023-11-01 RX ORDER — HYDROCHLOROTHIAZIDE 12.5 MG/1
12.5 TABLET ORAL DAILY
Qty: 90 TABLET | Refills: 3 | Status: SHIPPED | OUTPATIENT
Start: 2023-11-01

## 2024-01-29 DIAGNOSIS — I10 ESSENTIAL (PRIMARY) HYPERTENSION: ICD-10-CM

## 2024-01-30 RX ORDER — HYDROCHLOROTHIAZIDE 12.5 MG/1
12.5 TABLET ORAL DAILY
Qty: 90 TABLET | Refills: 1 | OUTPATIENT
Start: 2024-01-30

## 2024-04-08 ENCOUNTER — TELEMEDICINE (OUTPATIENT)
Dept: FAMILY MEDICINE CLINIC | Facility: CLINIC | Age: 69
End: 2024-04-08
Payer: COMMERCIAL

## 2024-04-08 DIAGNOSIS — R05.1 ACUTE COUGH: ICD-10-CM

## 2024-04-08 DIAGNOSIS — J06.9 ACUTE URI: Primary | ICD-10-CM

## 2024-04-08 PROCEDURE — 1123F ACP DISCUSS/DSCN MKR DOCD: CPT | Performed by: NURSE PRACTITIONER

## 2024-04-08 PROCEDURE — 99213 OFFICE O/P EST LOW 20 MIN: CPT | Performed by: NURSE PRACTITIONER

## 2024-04-08 RX ORDER — BENZONATATE 200 MG/1
200 CAPSULE ORAL 3 TIMES DAILY PRN
Qty: 30 CAPSULE | Refills: 0 | Status: SHIPPED | OUTPATIENT
Start: 2024-04-08 | End: 2024-04-18

## 2024-04-08 ASSESSMENT — PATIENT HEALTH QUESTIONNAIRE - PHQ9
SUM OF ALL RESPONSES TO PHQ QUESTIONS 1-9: 0
SUM OF ALL RESPONSES TO PHQ QUESTIONS 1-9: 0
1. LITTLE INTEREST OR PLEASURE IN DOING THINGS: NOT AT ALL
SUM OF ALL RESPONSES TO PHQ QUESTIONS 1-9: 0
2. FEELING DOWN, DEPRESSED OR HOPELESS: NOT AT ALL
SUM OF ALL RESPONSES TO PHQ9 QUESTIONS 1 & 2: 0
SUM OF ALL RESPONSES TO PHQ QUESTIONS 1-9: 0

## 2024-04-08 ASSESSMENT — ENCOUNTER SYMPTOMS
SHORTNESS OF BREATH: 0
WHEEZING: 0
COUGH: 1
RHINORRHEA: 0

## 2024-04-08 NOTE — PROGRESS NOTES
[] Abnormal -    Neck: [x] No visualized mass [] Abnormal -     Pulmonary/Chest: [x] Respiratory effort normal   [x] No visualized signs of difficulty breathing or respiratory distress        [] Abnormal -      Musculoskeletal:   [x] Normal gait with no signs of ataxia         [x] Normal range of motion of neck        [] Abnormal -     Neurological:        [x] No Facial Asymmetry (Cranial nerve 7 motor function) (limited exam due to video visit)          [x] No gaze palsy        [] Abnormal -          Skin:        [x] No significant exanthematous lesions or discoloration noted on facial skin         [] Abnormal -            Psychiatric:       [x] Normal Affect [] Abnormal -        [x] No Hallucinations    Other pertinent observable physical exam findings:-             --COLIN Murray - NP

## 2024-05-08 DIAGNOSIS — I48.91 ATRIAL FIBRILLATION, UNSPECIFIED TYPE (HCC): ICD-10-CM

## 2024-05-08 DIAGNOSIS — I10 ESSENTIAL (PRIMARY) HYPERTENSION: ICD-10-CM

## 2024-05-08 RX ORDER — METOPROLOL SUCCINATE 100 MG/1
100 TABLET, EXTENDED RELEASE ORAL DAILY
Qty: 90 TABLET | Refills: 3 | OUTPATIENT
Start: 2024-05-08

## 2024-05-08 RX ORDER — AMLODIPINE AND VALSARTAN 10; 160 MG/1; MG/1
1 TABLET ORAL DAILY
Qty: 90 TABLET | Refills: 3 | OUTPATIENT
Start: 2024-05-08

## 2024-05-20 DIAGNOSIS — I10 ESSENTIAL (PRIMARY) HYPERTENSION: ICD-10-CM

## 2024-05-20 DIAGNOSIS — I48.91 ATRIAL FIBRILLATION, UNSPECIFIED TYPE (HCC): ICD-10-CM

## 2024-05-20 RX ORDER — AMLODIPINE AND VALSARTAN 10; 160 MG/1; MG/1
1 TABLET ORAL DAILY
Qty: 90 TABLET | Refills: 3 | OUTPATIENT
Start: 2024-05-20

## 2024-05-20 RX ORDER — METOPROLOL SUCCINATE 100 MG/1
100 TABLET, EXTENDED RELEASE ORAL DAILY
Qty: 90 TABLET | Refills: 3 | OUTPATIENT
Start: 2024-05-20

## 2024-05-22 DIAGNOSIS — I10 ESSENTIAL (PRIMARY) HYPERTENSION: ICD-10-CM

## 2024-05-22 DIAGNOSIS — I48.91 ATRIAL FIBRILLATION, UNSPECIFIED TYPE (HCC): ICD-10-CM

## 2024-05-22 DIAGNOSIS — K21.9 GASTROESOPHAGEAL REFLUX DISEASE, UNSPECIFIED WHETHER ESOPHAGITIS PRESENT: ICD-10-CM

## 2024-05-22 RX ORDER — METOPROLOL SUCCINATE 100 MG/1
100 TABLET, EXTENDED RELEASE ORAL DAILY
Qty: 90 TABLET | Refills: 3 | Status: SHIPPED | OUTPATIENT
Start: 2024-05-22

## 2024-05-22 RX ORDER — AMLODIPINE AND VALSARTAN 10; 160 MG/1; MG/1
1 TABLET ORAL DAILY
Qty: 90 TABLET | Refills: 3 | Status: SHIPPED | OUTPATIENT
Start: 2024-05-22

## 2024-05-22 RX ORDER — LANSOPRAZOLE 30 MG/1
30 CAPSULE, DELAYED RELEASE ORAL
Qty: 90 CAPSULE | Refills: 3 | Status: SHIPPED | OUTPATIENT
Start: 2024-05-22 | End: 2025-05-17

## 2024-05-25 ENCOUNTER — APPOINTMENT (OUTPATIENT)
Dept: GENERAL RADIOLOGY | Age: 69
End: 2024-05-25
Payer: COMMERCIAL

## 2024-05-25 ENCOUNTER — HOSPITAL ENCOUNTER (EMERGENCY)
Age: 69
Discharge: HOME | End: 2024-05-25
Payer: COMMERCIAL

## 2024-05-25 ENCOUNTER — APPOINTMENT (OUTPATIENT)
Dept: CT IMAGING | Age: 69
End: 2024-05-25
Payer: COMMERCIAL

## 2024-05-25 VITALS
OXYGEN SATURATION: 99 % | TEMPERATURE: 97.7 F | DIASTOLIC BLOOD PRESSURE: 98 MMHG | SYSTOLIC BLOOD PRESSURE: 156 MMHG | BODY MASS INDEX: 44.92 KG/M2 | WEIGHT: 269.6 LBS | HEIGHT: 65 IN | RESPIRATION RATE: 17 BRPM | HEART RATE: 126 BPM

## 2024-05-25 DIAGNOSIS — S52.501A CLOSED FRACTURE OF DISTAL END OF RIGHT RADIUS, UNSPECIFIED FRACTURE MORPHOLOGY, INITIAL ENCOUNTER: Primary | ICD-10-CM

## 2024-05-25 DIAGNOSIS — S52.502A CLOSED FRACTURE OF DISTAL END OF LEFT RADIUS, UNSPECIFIED FRACTURE MORPHOLOGY, INITIAL ENCOUNTER: ICD-10-CM

## 2024-05-25 DIAGNOSIS — S09.90XA CLOSED HEAD INJURY, INITIAL ENCOUNTER: ICD-10-CM
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
## 2024-05-25 PROCEDURE — 6370000000 HC RX 637 (ALT 250 FOR IP): Performed by: PHYSICIAN ASSISTANT

## 2024-05-25 PROCEDURE — 73110 X-RAY EXAM OF WRIST: CPT

## 2024-05-25 PROCEDURE — 29125 APPL SHORT ARM SPLINT STATIC: CPT

## 2024-05-25 PROCEDURE — 99284 EMERGENCY DEPT VISIT MOD MDM: CPT

## 2024-05-25 PROCEDURE — 70450 CT HEAD/BRAIN W/O DYE: CPT

## 2024-05-25 RX ORDER — ONDANSETRON 4 MG/1
4 TABLET, ORALLY DISINTEGRATING ORAL 3 TIMES DAILY PRN
Qty: 9 TABLET | Refills: 0 | Status: SHIPPED | OUTPATIENT
Start: 2024-05-25 | End: 2024-05-28

## 2024-05-25 RX ORDER — HYDROCODONE BITARTRATE AND ACETAMINOPHEN 5; 325 MG/1; MG/1
1 TABLET ORAL
Status: COMPLETED | OUTPATIENT
Start: 2024-05-25 | End: 2024-05-25

## 2024-05-25 RX ORDER — HYDROCODONE BITARTRATE AND ACETAMINOPHEN 5; 325 MG/1; MG/1
1 TABLET ORAL EVERY 6 HOURS PRN
Qty: 10 TABLET | Refills: 0 | Status: SHIPPED | OUTPATIENT
Start: 2024-05-25 | End: 2024-05-28

## 2024-05-25 RX ADMIN — HYDROCODONE BITARTRATE AND ACETAMINOPHEN 1 TABLET: 5; 325 TABLET ORAL at 16:29

## 2024-05-25 ASSESSMENT — PAIN SCALES - GENERAL: PAINLEVEL_OUTOF10: 8

## 2024-05-25 ASSESSMENT — LIFESTYLE VARIABLES
HOW MANY STANDARD DRINKS CONTAINING ALCOHOL DO YOU HAVE ON A TYPICAL DAY: PATIENT DOES NOT DRINK
HOW OFTEN DO YOU HAVE A DRINK CONTAINING ALCOHOL: NEVER

## 2024-05-25 NOTE — ED NOTES
Patient mobility status  with no difficulty. Provider aware     I have reviewed discharge instructions with the patient.  The patient verbalized understanding.    Patient left ED via Discharge Method: ambulatory to Home with Spouse.    Opportunity for questions and clarification provided.     Patient given 2 scripts.      Patient refused d/c vitals due to pain and medication making her sleepy.     Deann Salguero LPN  05/25/24 8870

## 2024-05-25 NOTE — ED TRIAGE NOTES
Patient reports of falling today on her right side- landed on her right ar, and wrist. Left arm sore as well. Able to wiggle her finger. Can feel her finger as well.

## 2024-05-25 NOTE — DISCHARGE INSTRUCTIONS
Your head CT was normal. Your Xrs showed a fracture of both of your wrists. Follow up with orthopedics as planned, you should get a phone call from them to be seen on Tuesday.     Take pain medication as needed for pain control.     Elevate and ice to help with swelling.      Return to the ER for any new or worsening symptoms.

## 2024-05-25 NOTE — ED PROVIDER NOTES
Emergency Department Provider Note       PCP: Mickie Ruiz MD   Age: 69 y.o.   Sex: female     DISPOSITION Decision To Discharge 05/25/2024 05:03:04 PM       ICD-10-CM    1. Closed fracture of distal end of right radius, unspecified fracture morphology, initial encounter  S52.501A [facility]     HYDROcodone-acetaminophen (NORCO) 5-325 MG per tablet      2. Closed fracture of distal end of left radius, unspecified fracture morphology, initial encounter  S52.502A [facility]     HYDROcodone-acetaminophen (NORCO) 5-325 MG per tablet      3. Fall on same level from slipping, tripping or stumbling, initial encounter  W01.0XXA       4. Closed head injury, initial encounter  S09.90XA           Medical Decision Making     69-year-old female who is here with her  today after falling while at a [employer] outing today complaining of bilateral wrist pain.  She has good range of motion of both wrist and is neurovascularly intact distally, some mild swelling noted to both wrists on exam.  X-rays showing fractures bilaterally.  The right wrist there is an intra-articular impaction fracture of the distal radius with a mildly displaced ulnar styloid fracture.  The left wrist there is a comminuted fracture of the distal radial metaphysis with angulation.  There is suspected fracture of the dorsal aspect of the distal ulna as well.  I discussed x-ray findings with orthopedic PA on-call, recommends placing patient in sugar-tong splint bilaterally and they will try to get patient into the office first thing on Tuesday as it is a holiday weekend.  I will give patient a short course of pain medication to get her through the weekend until she can see orthopedics.  Return precautions discussed.  Here with  who is driving her home and will care for her over the weekend.  ED Course as of 05/25/24 1729   Sat May 25, 2024   1607 XR WRIST RIGHT (MIN 3 VIEWS) [KE]   1602  orders placed or performed during the hospital encounter of 05/25/24   XR WRIST LEFT (MIN 3 VIEWS)    Narrative    Left Wrist    INDICATION: Trauma    COMPARISON:  None    TECHNIQUE: Three views of the left wrist were obtained.    FINDINGS: There is a comminuted fracture involving the distal radial metaphysis  which is angulated dorsally. Fracture of the distal ulna is also suspected.      Impression    Comminuted fracture of the distal radial metaphysis with angulation.  There is suspected fracture of the dorsal aspect of the distal ulna.     XR WRIST RIGHT (MIN 3 VIEWS)    Narrative    Right Wrist    INDICATION: Trauma    COMPARISON:  None    TECHNIQUE: Three views of the right wrist were obtained.    FINDINGS: There is an intra-articular impacted fracture of the distal radius  with 1.5 cm of overriding of the fracture fragments. There is a minimally  displaced ulnar styloid fracture. Moderate overlying soft tissue swelling is  seen. No fractures of the carpal bones are identified. There is mild joint space  narrowing at the fifth distal interphalangeal joint with partial subluxation.      Impression    1. Intra-articular impaction fracture of the distal radius with a mildly  displaced ulnar styloid fracture. Moderate overlying soft tissue swelling.    2. 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     CT HEAD WO CONTRAST    Narrative    Head CT    INDICATION: Without    TECHNIQUE: Multiple 2D axial images obtained through the brain without  intravenous contrast.  Radiation dose reduction techniques were used for this  study:  All CT scans performed at this facility use one or all of the following:  Automated exposure control, adjustment of the mA and/or kVp according to  patient's size, iterative reconstruction.    COMPARISON: None    FINDINGS: No areas of abnormal attenuation are seen in the brain. There is no CT  evidence of acute hemorrhage or infarction. The ventricles are

## 2024-05-28 ENCOUNTER — OFFICE VISIT (OUTPATIENT)
Age: 69
End: 2024-05-28

## 2024-05-28 ENCOUNTER — TELEPHONE (OUTPATIENT)
Dept: ORTHOPEDIC SURGERY | Age: 69
End: 2024-05-28

## 2024-05-28 VITALS — BODY MASS INDEX: 38.92 KG/M2 | HEIGHT: 67 IN | WEIGHT: 248 LBS

## 2024-05-28 DIAGNOSIS — S52.571A OTHER CLOSED INTRA-ARTICULAR FRACTURE OF DISTAL END OF RIGHT RADIUS, INITIAL ENCOUNTER: Primary | ICD-10-CM

## 2024-05-28 DIAGNOSIS — S52.572A OTHER CLOSED INTRA-ARTICULAR FRACTURE OF DISTAL END OF LEFT RADIUS, INITIAL ENCOUNTER: ICD-10-CM

## 2024-05-28 PROBLEM — S52.501A CLOSED FRACTURE OF RIGHT DISTAL RADIUS: Status: ACTIVE | Noted: 2024-05-28

## 2024-05-28 PROBLEM — S52.502A CLOSED FRACTURE OF LEFT DISTAL RADIUS: Status: ACTIVE | Noted: 2024-05-28

## 2024-05-28 NOTE — TELEPHONE ENCOUNTER
Urgent ED referral  fracture of distal end of right radius, & Left radius  Please let me know where and when to see  Thanks

## 2024-05-28 NOTE — PERIOP NOTE
Patient verified name and .  Order for consent NOT found in EHR at time of PAT visit. Unable to verify case posting against order; surgery verified by patient.    Type 1B surgery, phone assessment complete.  Orders not received.  Labs per surgeon: none ordered  Labs per anesthesia protocol: K+ s/h for DOS    Patient answered medical/surgical history questions at their best of ability. All prior to admission medications documented in EPIC.    Patient instructed to continue taking all prescription medications up to the day of surgery but to take only the following medications the day of surgery according to anesthesia guidelines with a small sip of water: hydrocodone-acetaminophen if needed, metoprolol, lansoprazole.   Also, patient is requested to take 2 Tylenol in the morning and then again before bed on the day before surgery- if not taking hydrocodone-acetaminophen. Regular or extra strength may be used.       Patient informed that all vitamins and supplements should be held 7 days prior to surgery and NSAIDS 5 days prior to surgery. Prescription meds to hold: Eliquis hold as instructed by surgeon.  Patient stated that she was instructed to hold eliquis starting 24    Patient instructed on the following:    > Arrive at OPC Entrance, time of arrival to be called the day before by 1700  > NPO after midnight, unless otherwise indicated, including gum, mints, and ice chips  > Responsible adult must drive patient to the hospital, stay during surgery, and patient will need supervision 24 hours after anesthesia  > Use non moisturizing soap in shower the night before surgery and on the morning of surgery  > All piercings must be removed prior to arrival.    > Leave all valuables (money and jewelry) at home but bring insurance card and ID on DOS.   > You may be required to pay a deductible or co-pay on the day of your procedure. You can pre-pay by calling [phone] if your surgery is at the [facility] or

## 2024-05-29 ENCOUNTER — TELEPHONE (OUTPATIENT)
Dept: ORTHOPEDIC SURGERY | Age: 69
End: 2024-05-29

## 2024-05-29 ENCOUNTER — TELEPHONE (OUTPATIENT)
Dept: FAMILY MEDICINE CLINIC | Facility: CLINIC | Age: 69
End: 2024-05-29

## 2024-05-29 NOTE — TELEPHONE ENCOUNTER
----- Message from Mickie Ruiz MD sent at 5/29/2024  9:59 AM EDT -----  Pt is due for annual appt. Please get her scheduled in the next month. She is going for bilat wrist surg on Friday so definitely give her some time to recover from this. THanks

## 2024-05-29 NOTE — TELEPHONE ENCOUNTER
Dr. Ruiz called in regards to Ms. Solis coming off the Eliquis for her surgery this coming Friday.  She stated that the patient may come off of it but should resume it as soon as possible after surgery.  She will add an addendum to Dr. Colón's office note from yesterday so it is documented in the chart.

## 2024-05-29 NOTE — PROGRESS NOTES
Orthopaedic Hand Clinic Note    Name: Anne Marie Solis  YOB: 1955  Gender: female  MRN: [id number]      CC: Patient referred for evaluation of hand pain    HPI: Anne Marie Solis is a 69 y.o. female with a chief complaint of bilateral wrist pain. The injury occurred 3 days ago when the patient fell onto both hands. The pain is located diffusely about the wrists. Denies numbness or paresthesias of the fingers. Evaluation has included x-rays. Treatment to date has included splint. Denies loss of consciousness, head injury or neck pain.      ROS/Meds/PSH/PMH/FH/SH: I personally reviewed the patients standard intake form.  Pertinents are discussed in the HPI    Physical Examination  Musculoskeletal Exam:  Examination of the bilateral upper extremity demonstrates no open wounds. Sensation is intact throughout, cap refill in all fingers < 5 seconds. Finger motion is limited with moderate swelling of the hand and fingers. Tenderness over bilateral  distal radius. positive tenderness over the ulnar aspect of the wrist. No tenderness at elbow, anatomic snuffbox, hand, digits    Imaging / Electrodiagnostic Tests:     I independently reviewed and interpreted radiographs of the bilateral wrist.  They demonstrate right 3 part intraarticular distal radius fracture, with a volar shear component, and ulnar styloid fracture. There is a left distal radius fracture, which may extend intra-articularly. There is significant metaphyseal extension of the fracture, and volar displacement    Assessment:     ICD-10-CM    1. Other closed intra-articular fracture of distal end of right radius, initial encounter  S52.571A Ambulatory Referral to DME     Case Request     Ambulatory referral to Occupational Therapy      2. Other closed intra-articular fracture of distal end of left radius, initial encounter  S52.572A Ambulatory Referral to DME     Case Request     Ambulatory referral to Occupational Therapy          Plan:   We

## 2024-05-29 NOTE — H&P (VIEW-ONLY)
discussed the diagnosis and different treatment options. We discussed observation, casting, further imaging, and surgical fixation and the risks, benefits and alternatives of all these options.     After discussing in detail the patient elects to proceed with surgical treatment .     Patient understands risks and benefits of open reduction with internal fixation of bilateral distal radius fractures including but not limited to nerve injury, vessel injury, infection, failure to achieve desired results and possible need for additional surgery. Patient understands and wishes to proceed with surgery.     On Exam:   The patient is alert and oriented  Cardiovascular: regular rate and rhythm  Respiratory: Non labored breathing      Patient voiced accordance and understanding of the information provided and the formulated plan. All questions were answered to the patient's satisfaction during the encounter.        Carla Colón MD  Orthopaedic Surgery  05/29/24  7:33 AM

## 2024-05-30 ENCOUNTER — ANESTHESIA EVENT (OUTPATIENT)
Dept: SURGERY | Age: 69
End: 2024-05-30
Payer: COMMERCIAL

## 2024-05-30 NOTE — PERIOP NOTE
Preop department called to notify patient of arrival time for scheduled procedure. Instructions given to   - Arrive at OPC Entrance [address]  - Remain NPO after midnight, unless otherwise indicated, including gum, mints, and ice chips.   - Have a responsible adult to drive patient to the hospital, stay during surgery, and patient will need supervision 24 hours after anesthesia.   - Use antibacterial soap in shower the night before surgery and on the morning of surgery.       Was patient contacted: yes  Voicemail left:   Numbers contacted: [phone]   Arrival time: 0700

## 2024-05-30 NOTE — PROGRESS NOTES
Patient was fit for a Wrist/Forearm lacer for patients bilateral elbow pain. Patient is instructed the brace should fit nicely with in the palm and right below the knuckles on the dorsal side of the hand. The patient was aware the brace should fit snuggly around the wrist/forearm area. The strap placed through the thumb and first finger should fit comfortably to insure the brace does not slide or shift.     Patient read and signed documenting they understand and agree to [facility]'s current DME return policy.

## 2024-05-31 ENCOUNTER — ANESTHESIA (OUTPATIENT)
Dept: SURGERY | Age: 69
End: 2024-05-31
Payer: COMMERCIAL

## 2024-05-31 ENCOUNTER — APPOINTMENT (OUTPATIENT)
Dept: GENERAL RADIOLOGY | Age: 69
End: 2024-05-31
Attending: ORTHOPAEDIC SURGERY
Payer: COMMERCIAL

## 2024-05-31 ENCOUNTER — HOSPITAL ENCOUNTER (OUTPATIENT)
Age: 69
Setting detail: OUTPATIENT SURGERY
Discharge: HOME | End: 2024-05-31
Attending: ORTHOPAEDIC SURGERY | Admitting: ORTHOPAEDIC SURGERY
Payer: COMMERCIAL

## 2024-05-31 VITALS
DIASTOLIC BLOOD PRESSURE: 55 MMHG | HEIGHT: 67 IN | OXYGEN SATURATION: 92 % | HEART RATE: 85 BPM | BODY MASS INDEX: 38.92 KG/M2 | TEMPERATURE: 97.5 F | WEIGHT: 248 LBS | RESPIRATION RATE: 16 BRPM | SYSTOLIC BLOOD PRESSURE: 105 MMHG

## 2024-05-31 DIAGNOSIS — S52.532A CLOSED COLLES' FRACTURE OF LEFT RADIUS, INITIAL ENCOUNTER: Primary | ICD-10-CM

## 2024-05-31 PROCEDURE — 2580000003 HC RX 258: Performed by: ANESTHESIOLOGY

## 2024-05-31 PROCEDURE — 6360000002 HC RX W HCPCS: Performed by: ORTHOPAEDIC SURGERY

## 2024-05-31 PROCEDURE — 7100000010 HC PHASE II RECOVERY - FIRST 15 MIN: Performed by: ORTHOPAEDIC SURGERY

## 2024-05-31 PROCEDURE — 2500000003 HC RX 250 WO HCPCS: Performed by: NURSE ANESTHETIST, CERTIFIED REGISTERED

## 2024-05-31 PROCEDURE — 64415 NJX AA&/STRD BRCH PLXS IMG: CPT | Performed by: ANESTHESIOLOGY

## 2024-05-31 PROCEDURE — 2500000003 HC RX 250 WO HCPCS: Performed by: ANESTHESIOLOGY

## 2024-05-31 PROCEDURE — C1713 ANCHOR/SCREW BN/BN,TIS/BN: HCPCS | Performed by: ORTHOPAEDIC SURGERY

## 2024-05-31 PROCEDURE — 6370000000 HC RX 637 (ALT 250 FOR IP): Performed by: ANESTHESIOLOGY

## 2024-05-31 PROCEDURE — 3600000004 HC SURGERY LEVEL 4 BASE: Performed by: ORTHOPAEDIC SURGERY

## 2024-05-31 PROCEDURE — 6360000002 HC RX W HCPCS: Performed by: NURSE ANESTHETIST, CERTIFIED REGISTERED

## 2024-05-31 PROCEDURE — 6360000002 HC RX W HCPCS: Performed by: ANESTHESIOLOGY

## 2024-05-31 PROCEDURE — 7100000011 HC PHASE II RECOVERY - ADDTL 15 MIN: Performed by: ORTHOPAEDIC SURGERY

## 2024-05-31 PROCEDURE — 3700000001 HC ADD 15 MINUTES (ANESTHESIA): Performed by: ORTHOPAEDIC SURGERY

## 2024-05-31 PROCEDURE — 7100000001 HC PACU RECOVERY - ADDTL 15 MIN: Performed by: ORTHOPAEDIC SURGERY

## 2024-05-31 PROCEDURE — 2720000010 HC SURG SUPPLY STERILE: Performed by: ORTHOPAEDIC SURGERY

## 2024-05-31 PROCEDURE — 2709999900 HC NON-CHARGEABLE SUPPLY: Performed by: ORTHOPAEDIC SURGERY

## 2024-05-31 PROCEDURE — 3700000000 HC ANESTHESIA ATTENDED CARE: Performed by: ORTHOPAEDIC SURGERY

## 2024-05-31 PROCEDURE — 3600000014 HC SURGERY LEVEL 4 ADDTL 15MIN: Performed by: ORTHOPAEDIC SURGERY

## 2024-05-31 PROCEDURE — 7100000000 HC PACU RECOVERY - FIRST 15 MIN: Performed by: ORTHOPAEDIC SURGERY

## 2024-05-31 DEVICE — SCREW BNE L12MM DIA3.5MM CORT DST RAD VOLAR TI NONLOCKING: Type: IMPLANTABLE DEVICE | Site: WRIST | Status: FUNCTIONAL

## 2024-05-31 DEVICE — IMPLANTABLE DEVICE: Type: IMPLANTABLE DEVICE | Site: WRIST | Status: FUNCTIONAL

## 2024-05-31 DEVICE — PLATE BNE VOLAR HK FOR DST RAD SYS GEMINUS: Type: IMPLANTABLE DEVICE | Site: WRIST | Status: FUNCTIONAL

## 2024-05-31 DEVICE — PEG BNE FIX L20MM DIA2MM DST RAD TI SMOOTH FOR VOLAR: Type: IMPLANTABLE DEVICE | Site: WRIST | Status: FUNCTIONAL

## 2024-05-31 DEVICE — PLATE BNE 3 H R DST VOLAR RAD TI NAR GEMINUS: Type: IMPLANTABLE DEVICE | Site: WRIST | Status: FUNCTIONAL

## 2024-05-31 DEVICE — PEG BNE FIX L19MM DIA2MM DST RAD TI SMOOTH FOR VOLAR: Type: IMPLANTABLE DEVICE | Site: WRIST | Status: FUNCTIONAL

## 2024-05-31 DEVICE — SCREW BONE L13MM DIA3.5MM CORT DSTL RAD TI LCK FOR VOLAR: Type: IMPLANTABLE DEVICE | Site: WRIST | Status: FUNCTIONAL

## 2024-05-31 DEVICE — SCREW BONE L11MM DIA3.5MM CORT DSTL VOLAR RAD TI LCK FULL: Type: IMPLANTABLE DEVICE | Site: WRIST | Status: FUNCTIONAL

## 2024-05-31 DEVICE — PEG FIX L17MM DIA2MM DST RAD TI SMOOTH FOR VOLAR PLATING: Type: IMPLANTABLE DEVICE | Site: WRIST | Status: FUNCTIONAL

## 2024-05-31 DEVICE — SCREW BNE L12MM DIA3.5MM CORT DST VOLAR RAD TI LOK FULL: Type: IMPLANTABLE DEVICE | Site: WRIST | Status: FUNCTIONAL

## 2024-05-31 DEVICE — PEG BNE FIX L18MM DIA2MM DST RAD TI OPT 2 LOK SMOOTH: Type: IMPLANTABLE DEVICE | Site: WRIST | Status: FUNCTIONAL

## 2024-05-31 DEVICE — SCREW BNE FOR VOLAR HK PLT DST RAD SYS GEMINUS: Type: IMPLANTABLE DEVICE | Site: WRIST | Status: FUNCTIONAL

## 2024-05-31 RX ORDER — DEXAMETHASONE SODIUM PHOSPHATE 10 MG/ML
INJECTION INTRAMUSCULAR; INTRAVENOUS PRN
Status: DISCONTINUED | OUTPATIENT
Start: 2024-05-31 | End: 2024-05-31 | Stop reason: SDUPTHER

## 2024-05-31 RX ORDER — SODIUM CHLORIDE 0.9 % (FLUSH) 0.9 %
5-40 SYRINGE (ML) INJECTION PRN
Status: DISCONTINUED | OUTPATIENT
Start: 2024-05-31 | End: 2024-05-31 | Stop reason: HOSPADM

## 2024-05-31 RX ORDER — DEXMEDETOMIDINE HYDROCHLORIDE 100 UG/ML
INJECTION, SOLUTION INTRAVENOUS PRN
Status: DISCONTINUED | OUTPATIENT
Start: 2024-05-31 | End: 2024-05-31 | Stop reason: SDUPTHER

## 2024-05-31 RX ORDER — SODIUM CHLORIDE, SODIUM LACTATE, POTASSIUM CHLORIDE, CALCIUM CHLORIDE 600; 310; 30; 20 MG/100ML; MG/100ML; MG/100ML; MG/100ML
INJECTION, SOLUTION INTRAVENOUS CONTINUOUS
Status: DISCONTINUED | OUTPATIENT
Start: 2024-05-31 | End: 2024-05-31 | Stop reason: HOSPADM

## 2024-05-31 RX ORDER — MIDAZOLAM HYDROCHLORIDE 2 MG/2ML
2 INJECTION, SOLUTION INTRAMUSCULAR; INTRAVENOUS
Status: COMPLETED | OUTPATIENT
Start: 2024-05-31 | End: 2024-05-31

## 2024-05-31 RX ORDER — SODIUM CHLORIDE 9 MG/ML
INJECTION, SOLUTION INTRAVENOUS PRN
Status: DISCONTINUED | OUTPATIENT
Start: 2024-05-31 | End: 2024-05-31 | Stop reason: HOSPADM

## 2024-05-31 RX ORDER — DEXTROSE MONOHYDRATE 100 MG/ML
INJECTION, SOLUTION INTRAVENOUS CONTINUOUS PRN
Status: DISCONTINUED | OUTPATIENT
Start: 2024-05-31 | End: 2024-05-31 | Stop reason: HOSPADM

## 2024-05-31 RX ORDER — SODIUM CHLORIDE 0.9 % (FLUSH) 0.9 %
5-40 SYRINGE (ML) INJECTION EVERY 12 HOURS SCHEDULED
Status: DISCONTINUED | OUTPATIENT
Start: 2024-05-31 | End: 2024-05-31 | Stop reason: HOSPADM

## 2024-05-31 RX ORDER — PROCHLORPERAZINE EDISYLATE 5 MG/ML
5 INJECTION INTRAMUSCULAR; INTRAVENOUS
Status: DISCONTINUED | OUTPATIENT
Start: 2024-05-31 | End: 2024-05-31 | Stop reason: HOSPADM

## 2024-05-31 RX ORDER — LIDOCAINE HYDROCHLORIDE 10 MG/ML
1 INJECTION, SOLUTION INFILTRATION; PERINEURAL
Status: DISCONTINUED | OUTPATIENT
Start: 2024-05-31 | End: 2024-05-31 | Stop reason: HOSPADM

## 2024-05-31 RX ORDER — ACETAMINOPHEN 500 MG
1000 TABLET ORAL ONCE
Status: COMPLETED | OUTPATIENT
Start: 2024-05-31 | End: 2024-05-31

## 2024-05-31 RX ORDER — DEXAMETHASONE SODIUM PHOSPHATE 4 MG/ML
INJECTION, SOLUTION INTRA-ARTICULAR; INTRALESIONAL; INTRAMUSCULAR; INTRAVENOUS; SOFT TISSUE
Status: COMPLETED | OUTPATIENT
Start: 2024-05-31 | End: 2024-05-31

## 2024-05-31 RX ORDER — DIPHENHYDRAMINE HYDROCHLORIDE 50 MG/ML
12.5 INJECTION INTRAMUSCULAR; INTRAVENOUS
Status: DISCONTINUED | OUTPATIENT
Start: 2024-05-31 | End: 2024-05-31 | Stop reason: HOSPADM

## 2024-05-31 RX ORDER — HYDROMORPHONE HYDROCHLORIDE 2 MG/ML
INJECTION, SOLUTION INTRAMUSCULAR; INTRAVENOUS; SUBCUTANEOUS PRN
Status: DISCONTINUED | OUTPATIENT
Start: 2024-05-31 | End: 2024-05-31 | Stop reason: SDUPTHER

## 2024-05-31 RX ORDER — BUPIVACAINE HYDROCHLORIDE AND EPINEPHRINE 5; 5 MG/ML; UG/ML
INJECTION, SOLUTION EPIDURAL; INTRACAUDAL; PERINEURAL
Status: COMPLETED | OUTPATIENT
Start: 2024-05-31 | End: 2024-05-31

## 2024-05-31 RX ORDER — NALOXONE HYDROCHLORIDE 0.4 MG/ML
INJECTION, SOLUTION INTRAMUSCULAR; INTRAVENOUS; SUBCUTANEOUS PRN
Status: DISCONTINUED | OUTPATIENT
Start: 2024-05-31 | End: 2024-05-31 | Stop reason: HOSPADM

## 2024-05-31 RX ORDER — METOPROLOL TARTRATE 1 MG/ML
INJECTION, SOLUTION INTRAVENOUS PRN
Status: DISCONTINUED | OUTPATIENT
Start: 2024-05-31 | End: 2024-05-31 | Stop reason: SDUPTHER

## 2024-05-31 RX ORDER — PROPOFOL 10 MG/ML
INJECTION, EMULSION INTRAVENOUS PRN
Status: DISCONTINUED | OUTPATIENT
Start: 2024-05-31 | End: 2024-05-31 | Stop reason: SDUPTHER

## 2024-05-31 RX ORDER — HYDROMORPHONE HYDROCHLORIDE 2 MG/ML
0.5 INJECTION, SOLUTION INTRAMUSCULAR; INTRAVENOUS; SUBCUTANEOUS EVERY 10 MIN PRN
Status: DISCONTINUED | OUTPATIENT
Start: 2024-05-31 | End: 2024-05-31 | Stop reason: HOSPADM

## 2024-05-31 RX ORDER — METOPROLOL TARTRATE 1 MG/ML
5 INJECTION, SOLUTION INTRAVENOUS ONCE
Status: COMPLETED | OUTPATIENT
Start: 2024-05-31 | End: 2024-05-31

## 2024-05-31 RX ORDER — ONDANSETRON 2 MG/ML
INJECTION INTRAMUSCULAR; INTRAVENOUS PRN
Status: DISCONTINUED | OUTPATIENT
Start: 2024-05-31 | End: 2024-05-31 | Stop reason: SDUPTHER

## 2024-05-31 RX ORDER — LIDOCAINE HYDROCHLORIDE 20 MG/ML
INJECTION, SOLUTION EPIDURAL; INFILTRATION; INTRACAUDAL; PERINEURAL PRN
Status: DISCONTINUED | OUTPATIENT
Start: 2024-05-31 | End: 2024-05-31 | Stop reason: SDUPTHER

## 2024-05-31 RX ORDER — BUPIVACAINE HYDROCHLORIDE 2.5 MG/ML
INJECTION, SOLUTION EPIDURAL; INFILTRATION; INTRACAUDAL PRN
Status: DISCONTINUED | OUTPATIENT
Start: 2024-05-31 | End: 2024-05-31 | Stop reason: ALTCHOICE

## 2024-05-31 RX ORDER — OXYCODONE HYDROCHLORIDE AND ACETAMINOPHEN 5; 325 MG/1; MG/1
1 TABLET ORAL EVERY 4 HOURS PRN
Qty: 40 TABLET | Refills: 0 | Status: SHIPPED | OUTPATIENT
Start: 2024-05-31 | End: 2024-06-07

## 2024-05-31 RX ORDER — OXYCODONE HYDROCHLORIDE 5 MG/1
5 TABLET ORAL
Status: DISCONTINUED | OUTPATIENT
Start: 2024-05-31 | End: 2024-05-31 | Stop reason: HOSPADM

## 2024-05-31 RX ORDER — IBUPROFEN 600 MG/1
1 TABLET ORAL PRN
Status: DISCONTINUED | OUTPATIENT
Start: 2024-05-31 | End: 2024-05-31 | Stop reason: HOSPADM

## 2024-05-31 RX ORDER — KETAMINE HYDROCHLORIDE 50 MG/ML
INJECTION, SOLUTION INTRAMUSCULAR; INTRAVENOUS PRN
Status: DISCONTINUED | OUTPATIENT
Start: 2024-05-31 | End: 2024-05-31 | Stop reason: SDUPTHER

## 2024-05-31 RX ORDER — FENTANYL CITRATE 50 UG/ML
100 INJECTION, SOLUTION INTRAMUSCULAR; INTRAVENOUS
Status: COMPLETED | OUTPATIENT
Start: 2024-05-31 | End: 2024-05-31

## 2024-05-31 RX ORDER — KETOROLAC TROMETHAMINE 30 MG/ML
INJECTION, SOLUTION INTRAMUSCULAR; INTRAVENOUS PRN
Status: DISCONTINUED | OUTPATIENT
Start: 2024-05-31 | End: 2024-05-31 | Stop reason: SDUPTHER

## 2024-05-31 RX ADMIN — HYDROMORPHONE HYDROCHLORIDE 0.4 MG: 2 INJECTION INTRAMUSCULAR; INTRAVENOUS; SUBCUTANEOUS at 10:06

## 2024-05-31 RX ADMIN — MIDAZOLAM 2 MG: 1 INJECTION INTRAMUSCULAR; INTRAVENOUS at 07:40

## 2024-05-31 RX ADMIN — ACETAMINOPHEN 1000 MG: 500 TABLET, FILM COATED ORAL at 07:42

## 2024-05-31 RX ADMIN — HYDROMORPHONE HYDROCHLORIDE 0.2 MG: 2 INJECTION INTRAMUSCULAR; INTRAVENOUS; SUBCUTANEOUS at 10:49

## 2024-05-31 RX ADMIN — DEXAMETHASONE SODIUM PHOSPHATE 4 MG: 4 INJECTION, SOLUTION INTRAMUSCULAR; INTRAVENOUS at 07:40

## 2024-05-31 RX ADMIN — METOPROLOL TARTRATE 2 MG: 1 INJECTION, SOLUTION INTRAVENOUS at 09:56

## 2024-05-31 RX ADMIN — KETOROLAC TROMETHAMINE 30 MG: 30 INJECTION, SOLUTION INTRAMUSCULAR; INTRAVENOUS at 11:16

## 2024-05-31 RX ADMIN — SODIUM CHLORIDE, SODIUM LACTATE, POTASSIUM CHLORIDE, AND CALCIUM CHLORIDE: 600; 310; 30; 20 INJECTION, SOLUTION INTRAVENOUS at 07:43

## 2024-05-31 RX ADMIN — METOPROLOL TARTRATE 5 MG: 5 INJECTION INTRAVENOUS at 07:51

## 2024-05-31 RX ADMIN — Medication 2000 MG: at 09:48

## 2024-05-31 RX ADMIN — HYDROMORPHONE HYDROCHLORIDE 0.2 MG: 2 INJECTION INTRAMUSCULAR; INTRAVENOUS; SUBCUTANEOUS at 11:14

## 2024-05-31 RX ADMIN — LIDOCAINE HYDROCHLORIDE 40 MG: 20 INJECTION, SOLUTION EPIDURAL; INFILTRATION; INTRACAUDAL; PERINEURAL at 09:42

## 2024-05-31 RX ADMIN — HYDROMORPHONE HYDROCHLORIDE 0.4 MG: 2 INJECTION INTRAMUSCULAR; INTRAVENOUS; SUBCUTANEOUS at 09:54

## 2024-05-31 RX ADMIN — PROPOFOL 200 MG: 10 INJECTION, EMULSION INTRAVENOUS at 09:42

## 2024-05-31 RX ADMIN — METOPROLOL TARTRATE 1 MG: 1 INJECTION, SOLUTION INTRAVENOUS at 10:02

## 2024-05-31 RX ADMIN — DEXMEDETOMIDINE 12 MCG: 100 INJECTION, SOLUTION, CONCENTRATE INTRAVENOUS at 09:55

## 2024-05-31 RX ADMIN — ONDANSETRON 4 MG: 2 INJECTION INTRAMUSCULAR; INTRAVENOUS at 10:01

## 2024-05-31 RX ADMIN — SODIUM CHLORIDE, SODIUM LACTATE, POTASSIUM CHLORIDE, AND CALCIUM CHLORIDE: 600; 310; 30; 20 INJECTION, SOLUTION INTRAVENOUS at 11:20

## 2024-05-31 RX ADMIN — FENTANYL CITRATE 100 MCG: 50 INJECTION, SOLUTION INTRAMUSCULAR; INTRAVENOUS at 07:40

## 2024-05-31 RX ADMIN — DEXAMETHASONE SODIUM PHOSPHATE 10 MG: 10 INJECTION INTRAMUSCULAR; INTRAVENOUS at 10:01

## 2024-05-31 RX ADMIN — BUPIVACAINE HYDROCHLORIDE AND EPINEPHRINE BITARTRATE 30 ML: 5; .005 INJECTION, SOLUTION EPIDURAL; INTRACAUDAL; PERINEURAL at 07:40

## 2024-05-31 RX ADMIN — DEXMEDETOMIDINE 8 MCG: 100 INJECTION, SOLUTION, CONCENTRATE INTRAVENOUS at 10:50

## 2024-05-31 RX ADMIN — METOPROLOL TARTRATE 2 MG: 1 INJECTION, SOLUTION INTRAVENOUS at 10:04

## 2024-05-31 RX ADMIN — KETAMINE HYDROCHLORIDE 20 MG: 50 INJECTION, SOLUTION INTRAMUSCULAR; INTRAVENOUS at 09:49

## 2024-05-31 NOTE — ANESTHESIA POSTPROCEDURE EVALUATION
Department of Anesthesiology  Postprocedure Note    Patient: Anne Marie Solis  MRN: [id number]  YOB: 1955  Date of evaluation: 5/31/2024    Procedure Summary       Date: 05/31/24 Room / Location: [facility] OP OR 08 / [facility]    Anesthesia Start: 0933 Anesthesia Stop: 1142    Procedure: OPEN REDUCTION INTERNAL FIXATION OF BILATERAL DISTAL RADIUS FRACTURES (Bilateral: Wrist) Diagnosis:       Other closed intra-articular fracture of distal end of right radius, initial encounter      Other closed intra-articular fracture of distal end of left radius, initial encounter      (Other closed intra-articular fracture of distal end of right radius, initial encounter [S52.571A])      (Other closed intra-articular fracture of distal end of left radius, initial encounter [S52.572A])    Surgeons: Carla Colón MD Responsible Provider: Eliazar Jones MD    Anesthesia Type: General ASA Status: 3            Anesthesia Type: General    Christopher Phase I: Christopher Score: 7    Christopher Phase II: Christopher Score: 10    Anesthesia Post Evaluation    Patient location during evaluation: PACU  Patient participation: complete - patient participated  Level of consciousness: awake and alert  Airway patency: patent  Nausea & Vomiting: no nausea and no vomiting  Cardiovascular status: hemodynamically stable  Respiratory status: acceptable, nonlabored ventilation and spontaneous ventilation  Hydration status: euvolemic  Comments: BP (!) 105/55   Pulse 85   Temp 97.5 °F (36.4 °C) (Temporal)   Resp 16   Ht 1.702 m (5' 7\")   Wt 112.5 kg (248 lb)   SpO2 92%   BMI 38.84 kg/m²     Multimodal analgesia pain management approach  Pain management: adequate and satisfactory to patient    No notable events documented.

## 2024-05-31 NOTE — ANESTHESIA PROCEDURE NOTES
Peripheral Block    Patient location during procedure: pre-op  Reason for block: post-op pain management and at surgeon's request  Start time: 5/31/2024 7:40 AM  End time: 5/31/2024 7:43 AM  Staffing  Performed: anesthesiologist   Anesthesiologist: Eliazar Jones MD  Performed by: Eliazar Jones MD  Authorized by: Eliazar Jones MD    Preanesthetic Checklist  Completed: patient identified, IV checked, site marked, risks and benefits discussed, surgical/procedural consents, equipment checked, pre-op evaluation, timeout performed, anesthesia consent given, oxygen available and monitors applied/VS acknowledged  Peripheral Block   Patient position: sitting  Prep: ChloraPrep  Provider prep: sterile gloves and mask  Patient monitoring: cardiac monitor, continuous pulse ox, frequent blood pressure checks, IV access, oxygen and responsive to questions  Block type: Brachial plexus  Supraclavicular  Laterality: left  Injection technique: single-shot  Guidance: ultrasound guided    Needle   Needle type: insulated echogenic nerve stimulator needle   Needle localization: ultrasound guidance  Assessment   Injection assessment: negative aspiration for heme, no paresthesia on injection, local visualized surrounding nerve on ultrasound and no intravascular symptoms  Paresthesia pain: none  Slow fractionated injection: yes  Hemodynamics: stable  Outcomes: uncomplicated and patient tolerated procedure well    Additional Notes  Ultrasound image taken and stored in chart   Medications Administered  BUPivacaine 0.5%-EPINEPHrine injection [id number] - Perineural   30 mL - 5/31/2024 7:40:00 AM  dexAMETHasone (DECADRON) injection 4 mg/mL - Perineural   4 mg - 5/31/2024 7:40:00 AM

## 2024-05-31 NOTE — DISCHARGE INSTRUCTIONS
birth control. In addition to this, we recommend continuing your oral contraceptive as prescribed, unless otherwise instructed by your physician, during this time    After general anesthesia or intravenous sedation, for 24 hours or while taking prescription Narcotics:  Limit your activities  Some people will feel drowsy or dizzy for up to a few hours after waking up.  A responsible adult needs to be with you for the next 24 hours  Do not drive and operate hazardous machinery  Do not make important personal or business decisions  Do not drink alcoholic beverages  If you have not urinated within 8 hours after discharge, and you are experiencing discomfort from urinary retention, please go to the nearest ED.  If you have sleep apnea and have a CPAP machine, please use it for all naps and sleeping.  Please use caution when taking narcotics and any of your home medications that may cause drowsiness.  *  Please give a list of your current medications to your Primary Care Provider.  *  Please update this list whenever your medications are discontinued, doses are      changed, or new medications (including over-the-counter products) are added.  *  Please carry medication information at all times in case of emergency situations.    These are general instructions for a healthy lifestyle:  No smoking/ No tobacco products/ Avoid exposure to second hand smoke  Surgeon General's Warning:  Quitting smoking now greatly reduces serious risk to your health.  Obesity, smoking, and sedentary lifestyle greatly increases your risk for illness  A healthy diet, regular physical exercise & weight monitoring are important for maintaining a healthy lifestyle    You may be retaining fluid if you have a history of heart failure or if you experience any of the following symptoms:  Weight gain of 3 pounds or more overnight or 5 pounds in a week, increased swelling in our hands or feet or shortness of breath while lying flat in bed.  Please call

## 2024-05-31 NOTE — ANESTHESIA PRE PROCEDURE
COVID-19 Screening (If Applicable): No results found for: \"COVID19\"        Anesthesia Evaluation  Patient summary reviewed   no history of anesthetic complications:   Airway: Mallampati: II     Neck ROM: full  Comment: Short chin  overbite     Dental:    (+) poor dentition and partials      Pulmonary:Negative Pulmonary ROS and normal exam                               Cardiovascular:  Exercise tolerance: good (>4 METS)  (+) hypertension:, dysrhythmias (Elqiuis held, remained on metoprolol): atrial fibrillation, hyperlipidemia        Rhythm: irregular  Rate: abnormal                   PE comment: Afib, mildly tachy 100s   Neuro/Psych:   Negative Neuro/Psych ROS              GI/Hepatic/Renal:   (+) GERD: well controlled, morbid obesity          Endo/Other:    (+) : arthritis:..    (-) diabetes mellitus (prediabetic - no meds)               Abdominal: normal exam            Vascular: negative vascular ROS.         Other Findings:             Anesthesia Plan      general     ASA 3     (Patient has bilateral distal radius fractures. Dr. Colón reported that the L arm repair would likely be more extensive/painful so will plan on PNB on the L arm. Plan for general with LMA for procedure)  Induction: intravenous.      Anesthetic plan and risks discussed with patient and spouse.              Post-op pain plan if not by surgeon: single peripheral nerve block            Eliazar Jones MD   5/31/2024

## 2024-05-31 NOTE — PROGRESS NOTES
Spiritual Care Visit, initial visit.    Visited with patient at bedside.    Prayed for patient's healing and health.    Visit by Jorge A Segura, Staff . Flora., Vannessa.[email or web address]., B.A.

## 2024-06-06 NOTE — PROGRESS NOTES
object and open containers  Pt will be able to lift and carry items (ie grocery bags, laundry basket etc) with affected UE pain 2 of 10 or less.  Pt will be able to sleep through the night with no pain in bilateral UE's  Pts Quick DASH functional assessment score will be less than 20% functional deficit        [facility] Portal     OT Protocols

## 2024-06-07 ENCOUNTER — EVALUATION (OUTPATIENT)
Age: 69
End: 2024-06-07

## 2024-06-07 DIAGNOSIS — S52.571A OTHER CLOSED INTRA-ARTICULAR FRACTURE OF DISTAL END OF RIGHT RADIUS, INITIAL ENCOUNTER: ICD-10-CM

## 2024-06-07 DIAGNOSIS — Z78.9 DECREASED ACTIVITIES OF DAILY LIVING (ADL): ICD-10-CM

## 2024-06-07 DIAGNOSIS — M25.441 EFFUSION OF RIGHT HAND: ICD-10-CM

## 2024-06-07 DIAGNOSIS — M25.442 EFFUSION OF LEFT HAND: ICD-10-CM

## 2024-06-07 DIAGNOSIS — M25.631 STIFFNESS OF RIGHT WRIST JOINT: ICD-10-CM

## 2024-06-07 DIAGNOSIS — M25.531 BILATERAL WRIST PAIN: Primary | ICD-10-CM

## 2024-06-07 DIAGNOSIS — M25.532 BILATERAL WRIST PAIN: Primary | ICD-10-CM

## 2024-06-07 DIAGNOSIS — M25.632 STIFFNESS OF LEFT WRIST JOINT: ICD-10-CM

## 2024-06-07 DIAGNOSIS — M62.81 HAND MUSCLE WEAKNESS: ICD-10-CM

## 2024-06-07 DIAGNOSIS — S52.572A OTHER CLOSED INTRA-ARTICULAR FRACTURE OF DISTAL END OF LEFT RADIUS, INITIAL ENCOUNTER: ICD-10-CM

## 2024-06-11 ENCOUNTER — TREATMENT (OUTPATIENT)
Age: 69
End: 2024-06-11
Payer: COMMERCIAL

## 2024-06-11 DIAGNOSIS — M62.81 HAND MUSCLE WEAKNESS: ICD-10-CM

## 2024-06-11 DIAGNOSIS — M25.532 BILATERAL WRIST PAIN: Primary | ICD-10-CM

## 2024-06-11 DIAGNOSIS — M25.442 EFFUSION OF LEFT HAND: ICD-10-CM

## 2024-06-11 DIAGNOSIS — M25.632 STIFFNESS OF LEFT WRIST JOINT: ICD-10-CM

## 2024-06-11 DIAGNOSIS — M25.631 STIFFNESS OF RIGHT WRIST JOINT: ICD-10-CM

## 2024-06-11 DIAGNOSIS — M25.441 EFFUSION OF RIGHT HAND: ICD-10-CM

## 2024-06-11 DIAGNOSIS — M25.531 BILATERAL WRIST PAIN: Primary | ICD-10-CM

## 2024-06-11 DIAGNOSIS — Z78.9 DECREASED ACTIVITIES OF DAILY LIVING (ADL): ICD-10-CM

## 2024-06-11 PROCEDURE — 97010 HOT OR COLD PACKS THERAPY: CPT | Performed by: OCCUPATIONAL THERAPIST

## 2024-06-11 PROCEDURE — 97140 MANUAL THERAPY 1/> REGIONS: CPT | Performed by: OCCUPATIONAL THERAPIST

## 2024-06-11 PROCEDURE — 97110 THERAPEUTIC EXERCISES: CPT | Performed by: OCCUPATIONAL THERAPIST

## 2024-06-11 NOTE — PROGRESS NOTES
[facility] ORTHOPAEDICS  [address] [address]  Dept: [phone]      Occupational Therapy Daily Note     Referring MD: Friend, Carla NEWTON MD    Diagnosis:     ICD-10-CM    1. Bilateral wrist pain  M25.531     M25.532       2. Effusion of left hand  M25.442       3. Effusion of right hand  M25.441       4. Hand muscle weakness  M62.81       5. Stiffness of left wrist joint  M25.632       6. Stiffness of right wrist joint  M25.631       7. Decreased activities of daily living (ADL)  Z78.9              Surgery/Medical Dx: Date 5/31/24 Open treatment of left two part extra-articular distal radius fracture with internal fixation; Open treatment of right intra-articular distal radius fracture with internal fixation of three or more fragments s/p Closed intra-articular fracture of distal end of right radius; closed intra-articular fracture of distal end of left radius       Therapy precautions: Fracture precautions    History of injury/onset : Pt fell on 5/27/24 injuring bilateral wrists. She was treated in the ER and splinted.        Payor: Payor: BCBS /  /  /  Billing pattern: Commercial- substantial/midpoint time each CPT  Total Direct Treatment Time: 40 min  Total In Office Time: 50 min  Modifier needed: No  Episode visit count:  2     Preferred Name: Kaya    PERTINENT MEDICAL HISTORY     PMHX & Meds:   Past Medical History:   Diagnosis Date    Atrial fibrillation (HCC)     eliquis, metoprolol    Breast cancer (HCC) 2000    right    Flow murmur     5/2023 and 2019 PCP note indicated normal heart sounds    Gastrointestinal disorder     GERD medication    Hyperlipidemia     Hypertension     medication    IFG (impaired fasting glucose)     Pre-diabetes     Radiation therapy complication 2000    Tachycardia    ,   Past Surgical History:   Procedure Laterality Date    BREAST LUMPECTOMY Right 2000    radiation     CHOLECYSTECTOMY  1985    FOREARM SURGERY Bilateral 5/31/2024    OPEN REDUCTION

## 2024-06-13 ENCOUNTER — OFFICE VISIT (OUTPATIENT)
Age: 69
End: 2024-06-13

## 2024-06-13 ENCOUNTER — TREATMENT (OUTPATIENT)
Age: 69
End: 2024-06-13
Payer: COMMERCIAL

## 2024-06-13 DIAGNOSIS — M25.442 EFFUSION OF LEFT HAND: ICD-10-CM

## 2024-06-13 DIAGNOSIS — S52.572A OTHER CLOSED INTRA-ARTICULAR FRACTURE OF DISTAL END OF LEFT RADIUS, INITIAL ENCOUNTER: ICD-10-CM

## 2024-06-13 DIAGNOSIS — M25.632 STIFFNESS OF LEFT WRIST JOINT: ICD-10-CM

## 2024-06-13 DIAGNOSIS — M25.531 BILATERAL WRIST PAIN: Primary | ICD-10-CM

## 2024-06-13 DIAGNOSIS — M25.631 STIFFNESS OF RIGHT WRIST JOINT: ICD-10-CM

## 2024-06-13 DIAGNOSIS — M25.441 EFFUSION OF RIGHT HAND: ICD-10-CM

## 2024-06-13 DIAGNOSIS — M25.532 BILATERAL WRIST PAIN: Primary | ICD-10-CM

## 2024-06-13 DIAGNOSIS — M62.81 HAND MUSCLE WEAKNESS: ICD-10-CM

## 2024-06-13 DIAGNOSIS — S52.571A OTHER CLOSED INTRA-ARTICULAR FRACTURE OF DISTAL END OF RIGHT RADIUS, INITIAL ENCOUNTER: Primary | ICD-10-CM

## 2024-06-13 DIAGNOSIS — Z78.9 DECREASED ACTIVITIES OF DAILY LIVING (ADL): ICD-10-CM

## 2024-06-13 PROCEDURE — 97010 HOT OR COLD PACKS THERAPY: CPT | Performed by: OCCUPATIONAL THERAPIST

## 2024-06-13 PROCEDURE — 99024 POSTOP FOLLOW-UP VISIT: CPT | Performed by: ORTHOPAEDIC SURGERY

## 2024-06-13 PROCEDURE — 97140 MANUAL THERAPY 1/> REGIONS: CPT | Performed by: OCCUPATIONAL THERAPIST

## 2024-06-13 PROCEDURE — 97110 THERAPEUTIC EXERCISES: CPT | Performed by: OCCUPATIONAL THERAPIST

## 2024-06-13 NOTE — PROGRESS NOTES
[facility] ORTHOPAEDICS  [address] [address]  Dept: [phone]      Occupational Therapy Daily Note     Referring MD: Friend, Carla NEWTON MD    Diagnosis:     ICD-10-CM    1. Bilateral wrist pain  M25.531     M25.532       2. Effusion of left hand  M25.442       3. Effusion of right hand  M25.441       4. Hand muscle weakness  M62.81       5. Stiffness of left wrist joint  M25.632       6. Stiffness of right wrist joint  M25.631       7. Decreased activities of daily living (ADL)  Z78.9              Surgery/Medical Dx: Date 5/31/24 Open treatment of left two part extra-articular distal radius fracture with internal fixation; Open treatment of right intra-articular distal radius fracture with internal fixation of three or more fragments s/p Closed intra-articular fracture of distal end of right radius; closed intra-articular fracture of distal end of left radius       Therapy precautions: Fracture precautions    History of injury/onset : Pt fell on 5/27/24 injuring bilateral wrists. She was treated in the ER and splinted.        Payor: Payor: BCBS /  /  /  Billing pattern: Commercial- substantial/midpoint time each CPT  Total Direct Treatment Time: 40 min  Total In Office Time: 50 min  Modifier needed: No  Episode visit count:  3     Preferred Name: Kaya    PERTINENT MEDICAL HISTORY     PMHX & Meds:   Past Medical History:   Diagnosis Date    Atrial fibrillation (HCC)     eliquis, metoprolol    Breast cancer (HCC) 2000    right    Flow murmur     5/2023 and 2019 PCP note indicated normal heart sounds    Gastrointestinal disorder     GERD medication    Hyperlipidemia     Hypertension     medication    IFG (impaired fasting glucose)     Pre-diabetes     Radiation therapy complication 2000    Tachycardia    ,   Past Surgical History:   Procedure Laterality Date    BREAST LUMPECTOMY Right 2000    radiation     CHOLECYSTECTOMY  1985    FOREARM SURGERY Bilateral 5/31/2024    OPEN REDUCTION

## 2024-06-13 NOTE — PROGRESS NOTES
avoid lifting, pushing or pulling more than 2 lbs until next assessment and XRs in 4 weeks.      Carla Colón MD  06/13/24  11:37 AM

## 2024-06-17 ENCOUNTER — TREATMENT (OUTPATIENT)
Age: 69
End: 2024-06-17
Payer: COMMERCIAL

## 2024-06-17 DIAGNOSIS — M25.631 STIFFNESS OF RIGHT WRIST JOINT: ICD-10-CM

## 2024-06-17 DIAGNOSIS — M25.442 EFFUSION OF LEFT HAND: ICD-10-CM

## 2024-06-17 DIAGNOSIS — M25.632 STIFFNESS OF LEFT WRIST JOINT: ICD-10-CM

## 2024-06-17 DIAGNOSIS — M25.441 EFFUSION OF RIGHT HAND: ICD-10-CM

## 2024-06-17 DIAGNOSIS — M25.532 BILATERAL WRIST PAIN: Primary | ICD-10-CM

## 2024-06-17 DIAGNOSIS — M62.81 HAND MUSCLE WEAKNESS: ICD-10-CM

## 2024-06-17 DIAGNOSIS — Z78.9 DECREASED ACTIVITIES OF DAILY LIVING (ADL): ICD-10-CM

## 2024-06-17 DIAGNOSIS — M25.531 BILATERAL WRIST PAIN: Primary | ICD-10-CM

## 2024-06-17 PROCEDURE — 97140 MANUAL THERAPY 1/> REGIONS: CPT | Performed by: OCCUPATIONAL THERAPIST

## 2024-06-17 PROCEDURE — 97110 THERAPEUTIC EXERCISES: CPT | Performed by: OCCUPATIONAL THERAPIST

## 2024-06-17 PROCEDURE — 97010 HOT OR COLD PACKS THERAPY: CPT | Performed by: OCCUPATIONAL THERAPIST

## 2024-06-17 NOTE — PROGRESS NOTES
1 sets - 10 reps - 5 hold  - Finger MP Flexion AROM  - 2-3 x daily - 7 x weekly - 1 sets - 10 reps - 5 hold  - Seated Claw Fist AROM  - 2-3 x daily - 7 x weekly - 1 sets - 10 reps - 5 hold  - Individual Finger Extensions  - 2-3 x daily - 7 x weekly - 1 sets - 10 reps - 5 hold  - Thumb Opposition  - 2-3 x daily - 7 x weekly - 1 sets - 10 reps - 5 hold  - Finger Spreading  - 2-3 x daily - 7 x weekly - 1 sets - 10 reps - 5 hold    Patient Education  - Scar massage and Retrograde Massage  - ASSH Wrist Fracture  - Tubi- size F for swelling  OT Protocols

## 2024-06-20 ENCOUNTER — TREATMENT (OUTPATIENT)
Age: 69
End: 2024-06-20
Payer: COMMERCIAL

## 2024-06-20 DIAGNOSIS — Z78.9 DECREASED ACTIVITIES OF DAILY LIVING (ADL): ICD-10-CM

## 2024-06-20 DIAGNOSIS — M62.81 HAND MUSCLE WEAKNESS: ICD-10-CM

## 2024-06-20 DIAGNOSIS — M25.532 BILATERAL WRIST PAIN: Primary | ICD-10-CM

## 2024-06-20 DIAGNOSIS — M25.531 BILATERAL WRIST PAIN: Primary | ICD-10-CM

## 2024-06-20 DIAGNOSIS — M25.632 STIFFNESS OF LEFT WRIST JOINT: ICD-10-CM

## 2024-06-20 DIAGNOSIS — M25.631 STIFFNESS OF RIGHT WRIST JOINT: ICD-10-CM

## 2024-06-20 DIAGNOSIS — M25.442 EFFUSION OF LEFT HAND: ICD-10-CM

## 2024-06-20 DIAGNOSIS — M25.441 EFFUSION OF RIGHT HAND: ICD-10-CM

## 2024-06-20 PROCEDURE — 97140 MANUAL THERAPY 1/> REGIONS: CPT | Performed by: OCCUPATIONAL THERAPIST

## 2024-06-20 PROCEDURE — 97010 HOT OR COLD PACKS THERAPY: CPT | Performed by: OCCUPATIONAL THERAPIST

## 2024-06-20 PROCEDURE — 97110 THERAPEUTIC EXERCISES: CPT | Performed by: OCCUPATIONAL THERAPIST

## 2024-06-20 NOTE — PROGRESS NOTES
[facility] ORTHOPAEDICS  [address] [address]  Dept: [phone]      Occupational Therapy Daily Note     Referring MD: Friend, Carla NEWTON MD    Diagnosis:     ICD-10-CM    1. Bilateral wrist pain  M25.531     M25.532       2. Effusion of left hand  M25.442       3. Effusion of right hand  M25.441       4. Hand muscle weakness  M62.81       5. Stiffness of left wrist joint  M25.632       6. Stiffness of right wrist joint  M25.631       7. Decreased activities of daily living (ADL)  Z78.9              Surgery/Medical Dx: Date 5/31/24 Open treatment of left two part extra-articular distal radius fracture with internal fixation; Open treatment of right intra-articular distal radius fracture with internal fixation of three or more fragments s/p Closed intra-articular fracture of distal end of right radius; closed intra-articular fracture of distal end of left radius       Therapy precautions: Fracture precautions    History of injury/onset : Pt fell on 5/27/24 injuring bilateral wrists. She was treated in the ER and splinted.        Payor: Payor: BCBS /  /  /  Billing pattern: Commercial- substantial/midpoint time each CPT  Total Direct Treatment Time: 45 min  Total In Office Time: 55 min  Modifier needed: No  Episode visit count:  5     Preferred Name: Kaya    PERTINENT MEDICAL HISTORY     PMHX & Meds:   Past Medical History:   Diagnosis Date    Atrial fibrillation (HCC)     eliquis, metoprolol    Breast cancer (HCC) 2000    right    Flow murmur     5/2023 and 2019 PCP note indicated normal heart sounds    Gastrointestinal disorder     GERD medication    Hyperlipidemia     Hypertension     medication    IFG (impaired fasting glucose)     Pre-diabetes     Radiation therapy complication 2000    Tachycardia    ,   Past Surgical History:   Procedure Laterality Date    BREAST LUMPECTOMY Right 2000    radiation     CHOLECYSTECTOMY  1985    FOREARM SURGERY Bilateral 5/31/2024    OPEN REDUCTION

## 2024-06-25 NOTE — PROGRESS NOTES
[facility] ORTHOPAEDICS  [address] [facility] [address]  Dept: [phone]      Occupational Therapy Daily Note     Referring MD: Friend, aCrla NEWTON MD    Diagnosis:     ICD-10-CM    1. Bilateral wrist pain  M25.531     M25.532       2. Effusion of left hand  M25.442       3. Effusion of right hand  M25.441       4. Hand muscle weakness  M62.81       5. Stiffness of left wrist joint  M25.632       6. Decreased activities of daily living (ADL)  Z78.9       7. Stiffness of right wrist joint  M25.631              Surgery/Medical Dx: Date 5/31/24 Open treatment of left two part extra-articular distal radius fracture with internal fixation; Open treatment of right intra-articular distal radius fracture with internal fixation of three or more fragments s/p Closed intra-articular fracture of distal end of right radius; closed intra-articular fracture of distal end of left radius       Therapy precautions: Fracture precautions    History of injury/onset : Pt fell on 5/27/24 injuring bilateral wrists. She was treated in the ER and splinted.        Payor: Payor: BCBS /  /  /  Billing pattern: Commercial- substantial/midpoint time each CPT  Total Direct Treatment Time: 45 min  Total In Office Time: 55 min  Modifier needed: No  Episode visit count:  Visit count could not be calculated. Make sure you are using a visit which is associated with an episode.     Preferred Name: Kaya    PERTINENT MEDICAL HISTORY     PMHX & Meds:   Past Medical History:   Diagnosis Date    Atrial fibrillation (HCC)     eliquis, metoprolol    Breast cancer (HCC) 2000    right    Flow murmur     5/2023 and 2019 PCP note indicated normal heart sounds    Gastrointestinal disorder     GERD medication    Hyperlipidemia     Hypertension     medication    IFG (impaired fasting glucose)     Pre-diabetes     Radiation therapy complication 2000    Tachycardia    ,   Past Surgical History:   Procedure Laterality Date    BREAST LUMPECTOMY Right

## 2024-06-26 ENCOUNTER — TREATMENT (OUTPATIENT)
Age: 69
End: 2024-06-26
Payer: COMMERCIAL

## 2024-06-26 DIAGNOSIS — Z78.9 DECREASED ACTIVITIES OF DAILY LIVING (ADL): ICD-10-CM

## 2024-06-26 DIAGNOSIS — M25.531 BILATERAL WRIST PAIN: Primary | ICD-10-CM

## 2024-06-26 DIAGNOSIS — M25.631 STIFFNESS OF RIGHT WRIST JOINT: ICD-10-CM

## 2024-06-26 DIAGNOSIS — M25.442 EFFUSION OF LEFT HAND: ICD-10-CM

## 2024-06-26 DIAGNOSIS — M25.532 BILATERAL WRIST PAIN: Primary | ICD-10-CM

## 2024-06-26 DIAGNOSIS — M25.632 STIFFNESS OF LEFT WRIST JOINT: ICD-10-CM

## 2024-06-26 DIAGNOSIS — M62.81 HAND MUSCLE WEAKNESS: ICD-10-CM

## 2024-06-26 DIAGNOSIS — M25.441 EFFUSION OF RIGHT HAND: ICD-10-CM

## 2024-06-26 PROCEDURE — 97140 MANUAL THERAPY 1/> REGIONS: CPT | Performed by: OCCUPATIONAL THERAPIST

## 2024-06-26 PROCEDURE — 97010 HOT OR COLD PACKS THERAPY: CPT | Performed by: OCCUPATIONAL THERAPIST

## 2024-06-26 PROCEDURE — 97110 THERAPEUTIC EXERCISES: CPT | Performed by: OCCUPATIONAL THERAPIST

## 2024-06-28 ENCOUNTER — TREATMENT (OUTPATIENT)
Age: 69
End: 2024-06-28
Payer: COMMERCIAL

## 2024-06-28 DIAGNOSIS — M25.441 EFFUSION OF RIGHT HAND: ICD-10-CM

## 2024-06-28 DIAGNOSIS — M25.531 BILATERAL WRIST PAIN: Primary | ICD-10-CM

## 2024-06-28 DIAGNOSIS — M25.631 STIFFNESS OF RIGHT WRIST JOINT: ICD-10-CM

## 2024-06-28 DIAGNOSIS — M62.81 HAND MUSCLE WEAKNESS: ICD-10-CM

## 2024-06-28 DIAGNOSIS — M25.442 EFFUSION OF LEFT HAND: ICD-10-CM

## 2024-06-28 DIAGNOSIS — M25.632 STIFFNESS OF LEFT WRIST JOINT: ICD-10-CM

## 2024-06-28 DIAGNOSIS — M25.532 BILATERAL WRIST PAIN: Primary | ICD-10-CM

## 2024-06-28 DIAGNOSIS — Z78.9 DECREASED ACTIVITIES OF DAILY LIVING (ADL): ICD-10-CM

## 2024-06-28 PROCEDURE — 97010 HOT OR COLD PACKS THERAPY: CPT | Performed by: OCCUPATIONAL THERAPIST

## 2024-06-28 PROCEDURE — 97140 MANUAL THERAPY 1/> REGIONS: CPT | Performed by: OCCUPATIONAL THERAPIST

## 2024-06-28 PROCEDURE — 97110 THERAPEUTIC EXERCISES: CPT | Performed by: OCCUPATIONAL THERAPIST

## 2024-06-28 NOTE — PROGRESS NOTES
[facility]TH[facility]AED[facility]  [address] [facility] [address]  Dept: [phone]      Occupational Therapy Daily Note     Referring MD: Friend, Carla NEWTON MD    Diagnosis:     ICD-10-CM    1. Bilateral wrist pain  M25.531     M25.532       2. Effusion of left hand  M25.442       3. Effusion of right hand  M25.441       4. Hand muscle weakness  M62.81       5. Stiffness of left wrist joint  M25.632       6. Decreased activities of daily living (ADL)  Z78.9       7. Stiffness of right wrist joint  M25.631              Surgery/Medical Dx: Date 5/31/24 Open treatment of left two part extra-articular distal radius fracture with internal fixation; Open treatment of right intra-articular distal radius fracture with internal fixation of three or more fragments s/p Closed intra-articular fracture of distal end of right radius; closed intra-articular fracture of distal end of left radius       Therapy precautions: Fracture precautions    History of injury/onset : Pt fell on 5/27/24 injuring bilateral wrists. She was treated in the ER and splinted.        Payor: Payor: BCBS /  /  /  Billing pattern: Commercial- substantial/midpoint time each CPT  Total Direct Treatment Time: 40 min  Total In Office Time: 50 min  Modifier needed: No  Episode visit count:  7     Preferred Name: Kaya    PERTINENT MEDICAL HISTORY     PMHX & Meds:   Past Medical History:   Diagnosis Date    Atrial fibrillation (HCC)     eliquis, metoprolol    Breast cancer (HCC) 2000    right    Flow murmur     5/2023 and 2019 PCP note indicated normal heart sounds    Gastrointestinal disorder     GERD medication    Hyperlipidemia     Hypertension     medication    IFG (impaired fasting glucose)     Pre-diabetes     Radiation therapy complication 2000    Tachycardia    ,   Past Surgical History:   Procedure Laterality Date    BREAST LUMPECTOMY Right 2000    radiation     CHOLECYSTECTOMY  1985    FOREARM SURGERY Bilateral 5/31/2024    OPEN REDUCTION

## 2024-07-01 ENCOUNTER — TREATMENT (OUTPATIENT)
Age: 69
End: 2024-07-01
Payer: COMMERCIAL

## 2024-07-01 DIAGNOSIS — Z78.9 DECREASED ACTIVITIES OF DAILY LIVING (ADL): ICD-10-CM

## 2024-07-01 DIAGNOSIS — M25.441 EFFUSION OF RIGHT HAND: ICD-10-CM

## 2024-07-01 DIAGNOSIS — M25.442 EFFUSION OF LEFT HAND: ICD-10-CM

## 2024-07-01 DIAGNOSIS — M25.632 STIFFNESS OF LEFT WRIST JOINT: ICD-10-CM

## 2024-07-01 DIAGNOSIS — M62.81 HAND MUSCLE WEAKNESS: ICD-10-CM

## 2024-07-01 DIAGNOSIS — M25.631 STIFFNESS OF RIGHT WRIST JOINT: ICD-10-CM

## 2024-07-01 DIAGNOSIS — M25.531 BILATERAL WRIST PAIN: Primary | ICD-10-CM

## 2024-07-01 DIAGNOSIS — M25.532 BILATERAL WRIST PAIN: Primary | ICD-10-CM

## 2024-07-01 PROCEDURE — 97140 MANUAL THERAPY 1/> REGIONS: CPT | Performed by: OCCUPATIONAL THERAPIST

## 2024-07-01 PROCEDURE — 97010 HOT OR COLD PACKS THERAPY: CPT | Performed by: OCCUPATIONAL THERAPIST

## 2024-07-01 PROCEDURE — 97110 THERAPEUTIC EXERCISES: CPT | Performed by: OCCUPATIONAL THERAPIST

## 2024-07-01 NOTE — PROGRESS NOTES
[email or web address]  URL: [email or web address]  Date: 06/07/2024  Prepared by: Carl Chandra    Exercises  - Seated Elbow Flexion and Extension AROM  - 5 x daily - 7 x weekly - 1 sets - 10 reps - 5 hold  - Seated Forearm Pronation and Supination AROM  - 5 x daily - 7 x weekly - 1 sets - 10 reps - 5 hold  - Wrist Flexion Extension AROM - Palms Down  - 5 x daily - 7 x weekly - 1 sets - 10 reps - 5 hold  - Seated Wrist Radial and Ulnar Deviation AROM  - 5 x daily - 7 x weekly - 1 sets - 10 reps - 5 hold  - Seated Finger Composite Flexion Extension  - 2-3 x daily - 7 x weekly - 1 sets - 10 reps - 5 hold  - Seated Single Digit Intrinsic Stretch  - 2-3 x daily - 7 x weekly - 1 sets - 10 reps - 5 hold  - Finger MP Flexion AROM  - 2-3 x daily - 7 x weekly - 1 sets - 10 reps - 5 hold  - Seated Claw Fist AROM  - 2-3 x daily - 7 x weekly - 1 sets - 10 reps - 5 hold  - Individual Finger Extensions  - 2-3 x daily - 7 x weekly - 1 sets - 10 reps - 5 hold  - Thumb Opposition  - 2-3 x daily - 7 x weekly - 1 sets - 10 reps - 5 hold  - Finger Spreading  - 2-3 x daily - 7 x weekly - 1 sets - 10 reps - 5 hold    Patient Education  - Scar massage and Retrograde Massage  - ASSH Wrist Fracture  - Tubi- size F for swelling  OT Protocols

## 2024-07-03 ENCOUNTER — TREATMENT (OUTPATIENT)
Age: 69
End: 2024-07-03
Payer: COMMERCIAL

## 2024-07-03 DIAGNOSIS — M25.532 BILATERAL WRIST PAIN: ICD-10-CM

## 2024-07-03 DIAGNOSIS — M25.631 STIFFNESS OF RIGHT WRIST JOINT: ICD-10-CM

## 2024-07-03 DIAGNOSIS — M62.81 HAND MUSCLE WEAKNESS: ICD-10-CM

## 2024-07-03 DIAGNOSIS — Z78.9 DECREASED ACTIVITIES OF DAILY LIVING (ADL): ICD-10-CM

## 2024-07-03 DIAGNOSIS — M25.632 STIFFNESS OF LEFT WRIST JOINT: ICD-10-CM

## 2024-07-03 DIAGNOSIS — M25.442 EFFUSION OF LEFT HAND: Primary | ICD-10-CM

## 2024-07-03 DIAGNOSIS — M25.441 EFFUSION OF RIGHT HAND: ICD-10-CM

## 2024-07-03 DIAGNOSIS — M25.531 BILATERAL WRIST PAIN: ICD-10-CM

## 2024-07-03 PROCEDURE — 97140 MANUAL THERAPY 1/> REGIONS: CPT | Performed by: OCCUPATIONAL THERAPIST

## 2024-07-03 PROCEDURE — 97110 THERAPEUTIC EXERCISES: CPT | Performed by: OCCUPATIONAL THERAPIST

## 2024-07-03 PROCEDURE — 97010 HOT OR COLD PACKS THERAPY: CPT | Performed by: OCCUPATIONAL THERAPIST

## 2024-07-03 NOTE — PROGRESS NOTES
right wrist extension to at least 40 deg to improve function  Increase AROM left wrist flexion to at least 40 deg to improve function  Increase AROM left wrist extension to at least 30 ° to improve function  Increase AROM of bilateral wrist UD/RD by 5° to improve function with activities like opening containers/bottles.  Increase AROM right forearm supination to at least 55 ° to improve function with ADL's.  Increase AROM left forearm supination to at least 55 ° to improve function with ADL's.  Increase active composite digit flexion to full in right hand to improve ability to grasp and hold objects.  Pt will be able to bathe and dress herself with minimal assistance or less  Pts Quick DASH functional assessment score will be less than 60 % functional deficit      Long term goals: 9/5/2024 (90 days)   Pt will have at least  50°  of active wrist ext in the right UE  to improve ability to perform activities like grooming and typing.  Pt will have  at least 50°  of active wrist flexion in the right UE to improve function with writing and grooming.  Pt will have at least 50 deg of active wrist ext in the left UE to improve ability to perform activities like grooming and typing  Pt will have at least 50 deg of active wrist flexion left UE to improve ADL's  Pt will have at least 70°  of AROM bilateral forearm supination to improve ability  with tasks like opening doors, hold a plate.  Pt will have at least 70°  of AROM bilateral forearm pronation to improve ability with tasks like writing and placing/acquiring items on tabletop.  Pt will have adequate  strength in bilateral hands to improve ability to grasp and hold an object and open containers  Pt will be able to lift and carry items (ie grocery bags, laundry basket etc) with affected UE pain 2 of 10 or less.  Pt will be able to sleep through the night with no pain in bilateral UE's  Pts Quick DASH functional assessment score will be less than 20% functional

## 2024-07-08 ENCOUNTER — TREATMENT (OUTPATIENT)
Age: 69
End: 2024-07-08
Payer: COMMERCIAL

## 2024-07-08 DIAGNOSIS — M25.531 BILATERAL WRIST PAIN: ICD-10-CM

## 2024-07-08 DIAGNOSIS — M25.441 EFFUSION OF RIGHT HAND: ICD-10-CM

## 2024-07-08 DIAGNOSIS — M25.442 EFFUSION OF LEFT HAND: Primary | ICD-10-CM

## 2024-07-08 DIAGNOSIS — M25.632 STIFFNESS OF LEFT WRIST JOINT: ICD-10-CM

## 2024-07-08 DIAGNOSIS — M25.631 STIFFNESS OF RIGHT WRIST JOINT: ICD-10-CM

## 2024-07-08 DIAGNOSIS — Z78.9 DECREASED ACTIVITIES OF DAILY LIVING (ADL): ICD-10-CM

## 2024-07-08 DIAGNOSIS — M25.532 BILATERAL WRIST PAIN: ICD-10-CM

## 2024-07-08 DIAGNOSIS — M62.81 HAND MUSCLE WEAKNESS: ICD-10-CM

## 2024-07-08 PROCEDURE — 97140 MANUAL THERAPY 1/> REGIONS: CPT | Performed by: OCCUPATIONAL THERAPIST

## 2024-07-08 PROCEDURE — 97110 THERAPEUTIC EXERCISES: CPT | Performed by: OCCUPATIONAL THERAPIST

## 2024-07-08 PROCEDURE — 97010 HOT OR COLD PACKS THERAPY: CPT | Performed by: OCCUPATIONAL THERAPIST

## 2024-07-08 NOTE — PROGRESS NOTES
ex. Pt remains extremely guarded.     PLAN OF CARE   Continue with AROM. LLPS. May benefit from a LEVI device in future.    Effective Dates/Duration: 6/7/2024 TO 9/5/2024 (90 days)   Frequency 2x/week   Interventions may include but are not limited to:   (67408) Therapeutic exercise to develop strength and endurance, range of motion, and flexibility  (47146) Manual Therapy:   Consisting of but not limited to hands on treatment by therapist for connective tissue massage, pain management, edema management, joint mobilization and manipulation, manual traction, passive range of motion, soft tissue and neural system mobilization/manipulation and therapeutic massage.  (64664) Therapeutic activities:Direct one on one patient contact with provider, use of dynamic activities to improve functional performance  Modalities prn to address pain, spasms, and swelling: (66246) Electrical stimulation - attended  (32688/) Electrical stimulation- unattended  (95347) Ultrasound/phonophoresis  (86271) Hot/cold pack  (45487) Fluidotherapy  (49864) Paraffin  Home exercise program (HEP) development  (74310) Kineseotaping for Neuromuscular Re-education : Muscle inhibition or facilitation, space correction, to improve proprioception,; for endurance or correction of postural stabilizers; addressing trophic changes of complex regional pain syndrome  (80453) Kineseotaping for Manual Therapy Techniques for soft tissue mobilization, facilitation of muscles, pain management, lymphatic management, swelling management, scar mobilization, myofascial correction, mechanical joint correction or support  (29986) Kineseotaping for Therapeutic Procedure/Exercise  for strengthening or lengthening a muscle. Used in conjunction with retraining posture, endurance and flexibility    The referring medical provider has reviewed and approved this evaluation and plan of care as noted by the electronic signature attached to note.    GOALS       Short term goals:

## 2024-07-11 ENCOUNTER — TREATMENT (OUTPATIENT)
Age: 69
End: 2024-07-11
Payer: COMMERCIAL

## 2024-07-11 ENCOUNTER — OFFICE VISIT (OUTPATIENT)
Age: 69
End: 2024-07-11

## 2024-07-11 DIAGNOSIS — M25.532 BILATERAL WRIST PAIN: ICD-10-CM

## 2024-07-11 DIAGNOSIS — S52.571A OTHER CLOSED INTRA-ARTICULAR FRACTURE OF DISTAL END OF RIGHT RADIUS, INITIAL ENCOUNTER: Primary | ICD-10-CM

## 2024-07-11 DIAGNOSIS — M62.81 HAND MUSCLE WEAKNESS: ICD-10-CM

## 2024-07-11 DIAGNOSIS — M25.531 BILATERAL WRIST PAIN: ICD-10-CM

## 2024-07-11 DIAGNOSIS — M25.442 EFFUSION OF LEFT HAND: Primary | ICD-10-CM

## 2024-07-11 DIAGNOSIS — Z78.9 DECREASED ACTIVITIES OF DAILY LIVING (ADL): ICD-10-CM

## 2024-07-11 DIAGNOSIS — M25.631 STIFFNESS OF RIGHT WRIST JOINT: ICD-10-CM

## 2024-07-11 DIAGNOSIS — S52.572A OTHER CLOSED INTRA-ARTICULAR FRACTURE OF DISTAL END OF LEFT RADIUS, INITIAL ENCOUNTER: ICD-10-CM

## 2024-07-11 DIAGNOSIS — M25.441 EFFUSION OF RIGHT HAND: ICD-10-CM

## 2024-07-11 DIAGNOSIS — M25.632 STIFFNESS OF LEFT WRIST JOINT: ICD-10-CM

## 2024-07-11 PROCEDURE — 97110 THERAPEUTIC EXERCISES: CPT | Performed by: OCCUPATIONAL THERAPIST

## 2024-07-11 PROCEDURE — 97010 HOT OR COLD PACKS THERAPY: CPT | Performed by: OCCUPATIONAL THERAPIST

## 2024-07-11 PROCEDURE — 99024 POSTOP FOLLOW-UP VISIT: CPT | Performed by: ORTHOPAEDIC SURGERY

## 2024-07-11 PROCEDURE — 97140 MANUAL THERAPY 1/> REGIONS: CPT | Performed by: OCCUPATIONAL THERAPIST

## 2024-07-11 NOTE — PROGRESS NOTES
[facility] ORTHOPAEDICS  [address] [facility] [address]  Dept: [phone]      Occupational Therapy Daily Note     Referring MD: Friend, Carla NEWTON MD    Diagnosis:     ICD-10-CM    1. Effusion of left hand  M25.442       2. Effusion of right hand  M25.441       3. Hand muscle weakness  M62.81       4. Stiffness of left wrist joint  M25.632       5. Bilateral wrist pain  M25.531     M25.532       6. Decreased activities of daily living (ADL)  Z78.9       7. Stiffness of right wrist joint  M25.631              Surgery/Medical Dx: Date 5/31/24 Open treatment of left two part extra-articular distal radius fracture with internal fixation; Open treatment of right intra-articular distal radius fracture with internal fixation of three or more fragments s/p Closed intra-articular fracture of distal end of right radius; closed intra-articular fracture of distal end of left radius       Therapy precautions: Fracture precautions    History of injury/onset : Pt fell on 5/27/24 injuring bilateral wrists. She was treated in the ER and splinted.        Payor: Payor: BCBS /  /  /  Billing pattern: Commercial- substantial/midpoint time each CPT  Total Direct Treatment Time: 50 min  Total In Office Time: 60 min  Modifier needed: No  Episode visit count:  11     Preferred Name: Kaya    PERTINENT MEDICAL HISTORY     PMHX & Meds:   Past Medical History:   Diagnosis Date    Atrial fibrillation (HCC)     eliquis, metoprolol    Breast cancer (HCC) 2000    right    Flow murmur     5/2023 and 2019 PCP note indicated normal heart sounds    Gastrointestinal disorder     GERD medication    Hyperlipidemia     Hypertension     medication    IFG (impaired fasting glucose)     Pre-diabetes     Radiation therapy complication 2000    Tachycardia    ,   Past Surgical History:   Procedure Laterality Date    BREAST LUMPECTOMY Right 2000    radiation     CHOLECYSTECTOMY  1985    FOREARM SURGERY Bilateral 5/31/2024    OPEN

## 2024-07-11 NOTE — PROGRESS NOTES
Orthopaedic Hand Surgery Note    Name: Anne Marie Solis  Age: 69 y.o.  YOB: 1955  Gender: female  MRN: [id number]    Post Operative Visit: Open Reduction Internal Fixation Of Bilateral Distal Radius Fractures - Bilateral    HPI: Patient is status post Open Reduction Internal Fixation Of Bilateral Distal Radius Fractures - Bilateral on 5/31/2024. She has been attending therapy, but still has significantly limited range of motion    Physical Examination:  Wounds healed well. Sensation is intact in all fingers. Motor exam reveals no deficits. good finger range of motion. Wrist range of motion is still extremely limited. She has about 20 degrees of wrist flexion and extension on the right. She has about 20 degrees of extension on the left, 40 degrees of flexion. She is able to pronate to 80 degrees, supination is limited to about 40 degrees bilaterally    Imaging:     Wrist  XR: AP, Lateral, Oblique and wrist facet view     Clinical Indication:  1. Other closed intra-articular fracture of distal end of right radius, initial encounter    2. Other closed intra-articular fracture of distal end of left radius, initial encounter         Report: AP, lateral, oblique and wrist facet x-ray on the bilateral demonstrates distal radius fracture status post plate and screw osteosynthesis in acceptable alignment, no hardware complication is noted    Impression: Distal radius fracture status post osteosynthesis as described above     Carla Colón MD     Assessment:   1. Other closed intra-articular fracture of distal end of right radius, initial encounter    2. Other closed intra-articular fracture of distal end of left radius, initial encounter         Status post Open Reduction Internal Fixation Of Bilateral Distal Radius Fractures - Bilateral on 5/31/2024    Plan:  We discussed the treatment and therapy plan. Her range of motion is still extremely limited. She will continue therapy for active and passive range of

## 2024-07-15 ENCOUNTER — TREATMENT (OUTPATIENT)
Age: 69
End: 2024-07-15
Payer: COMMERCIAL

## 2024-07-15 DIAGNOSIS — M25.441 EFFUSION OF RIGHT HAND: ICD-10-CM

## 2024-07-15 DIAGNOSIS — M25.532 BILATERAL WRIST PAIN: ICD-10-CM

## 2024-07-15 DIAGNOSIS — M25.632 STIFFNESS OF LEFT WRIST JOINT: ICD-10-CM

## 2024-07-15 DIAGNOSIS — Z78.9 DECREASED ACTIVITIES OF DAILY LIVING (ADL): ICD-10-CM

## 2024-07-15 DIAGNOSIS — M62.81 HAND MUSCLE WEAKNESS: ICD-10-CM

## 2024-07-15 DIAGNOSIS — M25.442 EFFUSION OF LEFT HAND: Primary | ICD-10-CM

## 2024-07-15 DIAGNOSIS — M25.631 STIFFNESS OF RIGHT WRIST JOINT: ICD-10-CM

## 2024-07-15 DIAGNOSIS — M25.531 BILATERAL WRIST PAIN: ICD-10-CM

## 2024-07-15 PROCEDURE — 97010 HOT OR COLD PACKS THERAPY: CPT | Performed by: OCCUPATIONAL THERAPIST

## 2024-07-15 PROCEDURE — 97140 MANUAL THERAPY 1/> REGIONS: CPT | Performed by: OCCUPATIONAL THERAPIST

## 2024-07-15 PROCEDURE — 97110 THERAPEUTIC EXERCISES: CPT | Performed by: OCCUPATIONAL THERAPIST

## 2024-07-15 NOTE — PROGRESS NOTES
[facility] OT [facility] ORTHOPAEDICS  [address] [facility] [address]  Dept: [phone]      Occupational Therapy Daily Note     Referring MD: Friend, Carla NEWTON MD    Diagnosis:     ICD-10-CM    1. Effusion of left hand  M25.442       2. Effusion of right hand  M25.441       3. Hand muscle weakness  M62.81       4. Stiffness of left wrist joint  M25.632       5. Bilateral wrist pain  M25.531     M25.532       6. Decreased activities of daily living (ADL)  Z78.9       7. Stiffness of right wrist joint  M25.631              Surgery/Medical Dx: Date 5/31/24 Open treatment of left two part extra-articular distal radius fracture with internal fixation; Open treatment of right intra-articular distal radius fracture with internal fixation of three or more fragments s/p Closed intra-articular fracture of distal end of right radius; closed intra-articular fracture of distal end of left radius       Therapy precautions: Fracture precautions    History of injury/onset : Pt fell on 5/27/24 injuring bilateral wrists. She was treated in the ER and splinted.        Payor: Payor: BCBS /  /  /  Billing pattern: Commercial- substantial/midpoint time each CPT  Total Direct Treatment Time: 50 min  Total In Office Time: 60 min  Modifier needed: No  Episode visit count:  12     Preferred Name: Kaya    PERTINENT MEDICAL HISTORY     PMHX & Meds:   Past Medical History:   Diagnosis Date    Atrial fibrillation (HCC)     eliquis, metoprolol    Breast cancer (HCC) 2000    right    Flow murmur     5/2023 and 2019 PCP note indicated normal heart sounds    Gastrointestinal disorder     GERD medication    Hyperlipidemia     Hypertension     medication    IFG (impaired fasting glucose)     Pre-diabetes     Radiation therapy complication 2000    Tachycardia    ,   Past Surgical History:   Procedure Laterality Date    BREAST LUMPECTOMY Right 2000    radiation     CHOLECYSTECTOMY  1985    FOREARM SURGERY Bilateral 5/31/2024    OPEN REDUCTION

## 2024-07-17 ENCOUNTER — TREATMENT (OUTPATIENT)
Age: 69
End: 2024-07-17
Payer: COMMERCIAL

## 2024-07-17 DIAGNOSIS — M25.441 EFFUSION OF RIGHT HAND: ICD-10-CM

## 2024-07-17 DIAGNOSIS — M25.531 BILATERAL WRIST PAIN: ICD-10-CM

## 2024-07-17 DIAGNOSIS — M25.532 BILATERAL WRIST PAIN: ICD-10-CM

## 2024-07-17 DIAGNOSIS — M25.631 STIFFNESS OF RIGHT WRIST JOINT: ICD-10-CM

## 2024-07-17 DIAGNOSIS — M25.632 STIFFNESS OF LEFT WRIST JOINT: ICD-10-CM

## 2024-07-17 DIAGNOSIS — M62.81 HAND MUSCLE WEAKNESS: ICD-10-CM

## 2024-07-17 DIAGNOSIS — Z78.9 DECREASED ACTIVITIES OF DAILY LIVING (ADL): ICD-10-CM

## 2024-07-17 DIAGNOSIS — M25.442 EFFUSION OF LEFT HAND: Primary | ICD-10-CM

## 2024-07-17 PROCEDURE — 97110 THERAPEUTIC EXERCISES: CPT | Performed by: OCCUPATIONAL THERAPIST

## 2024-07-17 PROCEDURE — 97010 HOT OR COLD PACKS THERAPY: CPT | Performed by: OCCUPATIONAL THERAPIST

## 2024-07-17 NOTE — PROGRESS NOTES
[facility] OT [facility] ORTHOPAEDICS  [address] [facility] [address]  Dept: [phone]      Occupational Therapy Progress Note     Referring MD: Friend, Carla NEWTON MD    Diagnosis:     ICD-10-CM    1. Effusion of left hand  M25.442       2. Effusion of right hand  M25.441       3. Hand muscle weakness  M62.81       4. Stiffness of left wrist joint  M25.632       5. Bilateral wrist pain  M25.531     M25.532       6. Decreased activities of daily living (ADL)  Z78.9       7. Stiffness of right wrist joint  M25.631              Surgery/Medical Dx: Date 5/31/24 Open treatment of left two part extra-articular distal radius fracture with internal fixation; Open treatment of right intra-articular distal radius fracture with internal fixation of three or more fragments s/p Closed intra-articular fracture of distal end of right radius; closed intra-articular fracture of distal end of left radius       Therapy precautions: Fracture precautions    History of injury/onset : Pt fell on 5/27/24 injuring bilateral wrists. She was treated in the ER and splinted.        Payor: Payor: BCBS /  /  /  Billing pattern: Commercial- substantial/midpoint time each CPT  Total Direct Treatment Time: 40 min  Total In Office Time: 50 min  Modifier needed: No  Episode visit count:  13     Preferred Name: Kaya    PERTINENT MEDICAL HISTORY     PMHX & Meds:   Past Medical History:   Diagnosis Date    Atrial fibrillation (HCC)     eliquis, metoprolol    Breast cancer (HCC) 2000    right    Flow murmur     5/2023 and 2019 PCP note indicated normal heart sounds    Gastrointestinal disorder     GERD medication    Hyperlipidemia     Hypertension     medication    IFG (impaired fasting glucose)     Pre-diabetes     Radiation therapy complication 2000    Tachycardia    ,   Past Surgical History:   Procedure Laterality Date    BREAST LUMPECTOMY Right 2000    radiation     CHOLECYSTECTOMY  1985    FOREARM SURGERY Bilateral 5/31/2024    OPEN REDUCTION

## 2024-07-19 ENCOUNTER — TREATMENT (OUTPATIENT)
Age: 69
End: 2024-07-19
Payer: COMMERCIAL

## 2024-07-19 DIAGNOSIS — M25.632 STIFFNESS OF LEFT WRIST JOINT: ICD-10-CM

## 2024-07-19 DIAGNOSIS — M25.631 STIFFNESS OF RIGHT WRIST JOINT: ICD-10-CM

## 2024-07-19 DIAGNOSIS — M25.531 BILATERAL WRIST PAIN: ICD-10-CM

## 2024-07-19 DIAGNOSIS — M25.442 EFFUSION OF LEFT HAND: Primary | ICD-10-CM

## 2024-07-19 DIAGNOSIS — Z78.9 DECREASED ACTIVITIES OF DAILY LIVING (ADL): ICD-10-CM

## 2024-07-19 DIAGNOSIS — M25.441 EFFUSION OF RIGHT HAND: ICD-10-CM

## 2024-07-19 DIAGNOSIS — M25.532 BILATERAL WRIST PAIN: ICD-10-CM

## 2024-07-19 DIAGNOSIS — M62.81 HAND MUSCLE WEAKNESS: ICD-10-CM

## 2024-07-19 PROCEDURE — 97110 THERAPEUTIC EXERCISES: CPT | Performed by: OCCUPATIONAL THERAPIST

## 2024-07-19 PROCEDURE — 97010 HOT OR COLD PACKS THERAPY: CPT | Performed by: OCCUPATIONAL THERAPIST

## 2024-07-19 PROCEDURE — 97032 APPL MODALITY 1+ESTIM EA 15: CPT | Performed by: OCCUPATIONAL THERAPIST

## 2024-07-19 NOTE — PROGRESS NOTES
like writing and placing/acquiring items on tabletop.  Pt will have adequate  strength in bilateral hands to improve ability to grasp and hold an object and open containers  Pt will be able to lift and carry items (ie grocery bags, laundry basket etc) with affected UE pain 2 of 10 or less.  Pt will be able to sleep through the night with no pain in bilateral UE's  Pts Quick DASH functional assessment score will be less than 20% functional deficit        Setup   Access Code: [email or web address]  URL: [email or web address]  Date: 06/07/2024  Prepared by: Carl Chandra    Exercises  - Seated Elbow Flexion and Extension AROM  - 5 x daily - 7 x weekly - 1 sets - 10 reps - 5 hold  - Seated Forearm Pronation and Supination AROM  - 5 x daily - 7 x weekly - 1 sets - 10 reps - 5 hold  - Wrist Flexion Extension AROM - Palms Down  - 5 x daily - 7 x weekly - 1 sets - 10 reps - 5 hold  - Seated Wrist Radial and Ulnar Deviation AROM  - 5 x daily - 7 x weekly - 1 sets - 10 reps - 5 hold  - Seated Finger Composite Flexion Extension  - 2-3 x daily - 7 x weekly - 1 sets - 10 reps - 5 hold  - Seated Single Digit Intrinsic Stretch  - 2-3 x daily - 7 x weekly - 1 sets - 10 reps - 5 hold  - Finger MP Flexion AROM  - 2-3 x daily - 7 x weekly - 1 sets - 10 reps - 5 hold  - Seated Claw Fist AROM  - 2-3 x daily - 7 x weekly - 1 sets - 10 reps - 5 hold  - Individual Finger Extensions  - 2-3 x daily - 7 x weekly - 1 sets - 10 reps - 5 hold  - Thumb Opposition  - 2-3 x daily - 7 x weekly - 1 sets - 10 reps - 5 hold  - Finger Spreading  - 2-3 x daily - 7 x weekly - 1 sets - 10 reps - 5 hold    Patient Education  - Scar massage and Retrograde Massage  - ASSH Wrist Fracture  - Tubi- size F for swelling    Access Code: [email or web address]  URL: [email or web address]  Date: 06/28/2024  Prepared by: Carl Chandra    Exercises  -  Seated Wrist Prayer Stretch  - 4 x daily - 7 x weekly - 1 sets - 10 reps - 5 hold    Access

## 2024-07-22 ENCOUNTER — TREATMENT (OUTPATIENT)
Age: 69
End: 2024-07-22
Payer: COMMERCIAL

## 2024-07-22 DIAGNOSIS — M25.442 EFFUSION OF LEFT HAND: Primary | ICD-10-CM

## 2024-07-22 DIAGNOSIS — M25.441 EFFUSION OF RIGHT HAND: ICD-10-CM

## 2024-07-22 DIAGNOSIS — Z78.9 DECREASED ACTIVITIES OF DAILY LIVING (ADL): ICD-10-CM

## 2024-07-22 DIAGNOSIS — M25.531 BILATERAL WRIST PAIN: ICD-10-CM

## 2024-07-22 DIAGNOSIS — M25.632 STIFFNESS OF LEFT WRIST JOINT: ICD-10-CM

## 2024-07-22 DIAGNOSIS — M25.631 STIFFNESS OF RIGHT WRIST JOINT: ICD-10-CM

## 2024-07-22 DIAGNOSIS — M62.81 HAND MUSCLE WEAKNESS: ICD-10-CM

## 2024-07-22 DIAGNOSIS — M25.532 BILATERAL WRIST PAIN: ICD-10-CM

## 2024-07-22 PROCEDURE — 97110 THERAPEUTIC EXERCISES: CPT | Performed by: OCCUPATIONAL THERAPIST

## 2024-07-22 PROCEDURE — 97032 APPL MODALITY 1+ESTIM EA 15: CPT | Performed by: OCCUPATIONAL THERAPIST

## 2024-07-22 PROCEDURE — 97010 HOT OR COLD PACKS THERAPY: CPT | Performed by: OCCUPATIONAL THERAPIST

## 2024-07-22 NOTE — PROGRESS NOTES
INTERNAL FIXATION OF BILATERAL DISTAL RADIUS FRACTURES performed by Carla Colón MD at [facility]    LUMBAR LAMINECTOMY      NICKY AND BSO (CERVIX REMOVED)      age 25    TYMPANOSTOMY TUBE PLACEMENT  2013      Medications. : Reviewed in chart  Allergies:   Allergies   Allergen Reactions    Aspirin Other (See Comments)     abd pain      SUBJECTIVE     Current Symptoms/Chief complaints:   Chief Complaint   Patient presents with    Wrist Injury       Medications: reviewed in chart  Home program compliance: Reporting compliance    Neuro screen: denies numbness and tingling    Patient Stated Goals: \"Be able to use my hands\"    Pt reports \"I did my exercises\"    OBJECTIVE     Functional Outcome Measures: Quick Dash  44 score=   75 % functional deficit  Hand/Side Dominance: right handed  Observation/Posture: Holding UE in protected position  Palpation: tenderness with palpation volar wrist  Swelling/Edema: Measured circumferentially bilateral wrists: right 8\" left 7-1/2\"  Skin Integrity: healing incisions. Steri-strips donned; no drainage or signs of infection     ROM MEASUREMENT       Bilateral shoulders: WFL's                     ROM RIGHT  (AROM)   IE LEFT  (AROM)  IE Right  6/17/24 Left  6/17/24 Right  6/26/24 Left  6/26/24 Right  7/1/24 Left  7/1/24 Right  7/3/24 Left  7/3/24 Right  7/8/24 Left  7/8/24 Right  7/15/24 Left  715/24 Right  7/17/24 Left  7/17/24 Right  7/22/24 Left  7/22/24   Elbow extension/flexion WNL's WNL's                    Supination/Pronation 45/75 45/50 45/80 65/75   30/80 40/85 30/ 40/ 30/ 35/ 30/ 35/ 30/80 25/80 30/80 pre  45/post-tx 25/80 Pre  40/post-tx   Wrist Extension/Flexion 30/20 17/35 35/25 32/30 35/25 32/30 25 30 30/35 30/30 30/30 40/30 30/30 35/25 30/25 35/30 30/25 35/30 30/35   RD/UD 0/16 5/10             5/10 10/17        RIGHT  (AROM)   IE LEFT  (AROM)  IE      Thumb MP ext/flex 0/40 0/45     Thumb IP ext/flex 0/20 0/32     Radial add/abduction 43 50     Palmar add/abduction 40 45

## 2024-07-26 ENCOUNTER — TREATMENT (OUTPATIENT)
Age: 69
End: 2024-07-26
Payer: COMMERCIAL

## 2024-07-26 DIAGNOSIS — M25.442 EFFUSION OF LEFT HAND: Primary | ICD-10-CM

## 2024-07-26 DIAGNOSIS — M25.531 BILATERAL WRIST PAIN: ICD-10-CM

## 2024-07-26 DIAGNOSIS — M62.81 HAND MUSCLE WEAKNESS: ICD-10-CM

## 2024-07-26 DIAGNOSIS — M25.631 STIFFNESS OF RIGHT WRIST JOINT: ICD-10-CM

## 2024-07-26 DIAGNOSIS — M25.532 BILATERAL WRIST PAIN: ICD-10-CM

## 2024-07-26 DIAGNOSIS — Z78.9 DECREASED ACTIVITIES OF DAILY LIVING (ADL): ICD-10-CM

## 2024-07-26 DIAGNOSIS — M25.441 EFFUSION OF RIGHT HAND: ICD-10-CM

## 2024-07-26 DIAGNOSIS — M25.632 STIFFNESS OF LEFT WRIST JOINT: ICD-10-CM

## 2024-07-26 PROCEDURE — 97010 HOT OR COLD PACKS THERAPY: CPT | Performed by: OCCUPATIONAL THERAPIST

## 2024-07-26 PROCEDURE — 97032 APPL MODALITY 1+ESTIM EA 15: CPT | Performed by: OCCUPATIONAL THERAPIST

## 2024-07-26 PROCEDURE — 97110 THERAPEUTIC EXERCISES: CPT | Performed by: OCCUPATIONAL THERAPIST

## 2024-07-26 NOTE — PROGRESS NOTES
[facility] OT [facility] ORTHOPAEDICS  [address] [facility] [address]  Dept: [phone]      Occupational Therapy Daily Note     Referring MD: Friend, Carla NEWTON MD    Diagnosis:     ICD-10-CM    1. Effusion of left hand  M25.442       2. Effusion of right hand  M25.441       3. Hand muscle weakness  M62.81       4. Stiffness of left wrist joint  M25.632       5. Bilateral wrist pain  M25.531     M25.532       6. Decreased activities of daily living (ADL)  Z78.9       7. Stiffness of right wrist joint  M25.631              Surgery/Medical Dx: Date 5/31/24 Open treatment of left two part extra-articular distal radius fracture with internal fixation; Open treatment of right intra-articular distal radius fracture with internal fixation of three or more fragments s/p Closed intra-articular fracture of distal end of right radius; closed intra-articular fracture of distal end of left radius       Therapy precautions: Fracture precautions    History of injury/onset : Pt fell on 5/27/24 injuring bilateral wrists. She was treated in the ER and splinted.        Payor: Payor: BCBS /  /  /  Billing pattern: Commercial- substantial/midpoint time each CPT  Total Direct Treatment Time: 65 min  Total In Office Time: 75 min  Modifier needed: No  Episode visit count:  16     Preferred Name: Kaya    PERTINENT MEDICAL HISTORY     PMHX & Meds:   Past Medical History:   Diagnosis Date    Atrial fibrillation (HCC)     eliquis, metoprolol    Breast cancer (HCC) 2000    right    Flow murmur     5/2023 and 2019 PCP note indicated normal heart sounds    Gastrointestinal disorder     GERD medication    Hyperlipidemia     Hypertension     medication    IFG (impaired fasting glucose)     Pre-diabetes     Radiation therapy complication 2000    Tachycardia    ,   Past Surgical History:   Procedure Laterality Date    BREAST LUMPECTOMY Right 2000    radiation     CHOLECYSTECTOMY  1985    FOREARM SURGERY Bilateral 5/31/2024    OPEN REDUCTION

## 2024-07-29 ENCOUNTER — TREATMENT (OUTPATIENT)
Age: 69
End: 2024-07-29
Payer: COMMERCIAL

## 2024-07-29 DIAGNOSIS — M25.631 STIFFNESS OF RIGHT WRIST JOINT: ICD-10-CM

## 2024-07-29 DIAGNOSIS — M25.531 BILATERAL WRIST PAIN: ICD-10-CM

## 2024-07-29 DIAGNOSIS — M25.441 EFFUSION OF RIGHT HAND: ICD-10-CM

## 2024-07-29 DIAGNOSIS — Z78.9 DECREASED ACTIVITIES OF DAILY LIVING (ADL): ICD-10-CM

## 2024-07-29 DIAGNOSIS — M25.632 STIFFNESS OF LEFT WRIST JOINT: ICD-10-CM

## 2024-07-29 DIAGNOSIS — M25.532 BILATERAL WRIST PAIN: ICD-10-CM

## 2024-07-29 DIAGNOSIS — M62.81 HAND MUSCLE WEAKNESS: ICD-10-CM

## 2024-07-29 DIAGNOSIS — M25.442 EFFUSION OF LEFT HAND: Primary | ICD-10-CM

## 2024-07-29 PROCEDURE — 97110 THERAPEUTIC EXERCISES: CPT | Performed by: OCCUPATIONAL THERAPIST

## 2024-07-29 PROCEDURE — 97032 APPL MODALITY 1+ESTIM EA 15: CPT | Performed by: OCCUPATIONAL THERAPIST

## 2024-07-29 PROCEDURE — 97010 HOT OR COLD PACKS THERAPY: CPT | Performed by: OCCUPATIONAL THERAPIST

## 2024-07-29 NOTE — PROGRESS NOTES
[facility] ORTHOPAEDICS  [address] [address]  Dept: [phone]      Occupational Therapy Daily Note     Referring MD: Friend, Carla NEWTON MD    Diagnosis:     ICD-10-CM    1. Effusion of left hand  M25.442       2. Effusion of right hand  M25.441       3. Stiffness of left wrist joint  M25.632       4. Bilateral wrist pain  M25.531     M25.532       5. Decreased activities of daily living (ADL)  Z78.9       6. Hand muscle weakness  M62.81       7. Stiffness of right wrist joint  M25.631              Surgery/Medical Dx: Date 5/31/24 Open treatment of left two part extra-articular distal radius fracture with internal fixation; Open treatment of right intra-articular distal radius fracture with internal fixation of three or more fragments s/p Closed intra-articular fracture of distal end of right radius; closed intra-articular fracture of distal end of left radius       Therapy precautions: Fracture precautions    History of injury/onset : Pt fell on 5/27/24 injuring bilateral wrists. She was treated in the ER and splinted.        Payor: Payor: BCBS /  /  /  Billing pattern: Commercial- substantial/midpoint time each CPT  Total Direct Treatment Time: 60 min  Total In Office Time: 70 min  Modifier needed: No  Episode visit count:  17     Preferred Name: Kaya    PERTINENT MEDICAL HISTORY     PMHX & Meds:   Past Medical History:   Diagnosis Date    Atrial fibrillation (HCC)     eliquis, metoprolol    Breast cancer (HCC) 2000    right    Flow murmur     5/2023 and 2019 PCP note indicated normal heart sounds    Gastrointestinal disorder     GERD medication    Hyperlipidemia     Hypertension     medication    IFG (impaired fasting glucose)     Pre-diabetes     Radiation therapy complication 2000    Tachycardia    ,   Past Surgical History:   Procedure Laterality Date    BREAST LUMPECTOMY Right 2000    radiation     CHOLECYSTECTOMY  1985    FOREARM SURGERY Bilateral 5/31/2024    OPEN REDUCTION

## 2024-07-31 ENCOUNTER — TELEPHONE (OUTPATIENT)
Dept: ORTHOPEDIC SURGERY | Age: 69
End: 2024-07-31

## 2024-07-31 NOTE — TELEPHONE ENCOUNTER
They received an order from Annie Chandra for bilateral wrists device. They have faxed a new script on July 29 to be signed and returned. The script they received was for PT but not for the device itself. Can you let her know the status on getting this returned.   Her fax # is [phone].

## 2024-08-02 ENCOUNTER — TREATMENT (OUTPATIENT)
Age: 69
End: 2024-08-02
Payer: COMMERCIAL

## 2024-08-02 DIAGNOSIS — M25.632 STIFFNESS OF LEFT WRIST JOINT: ICD-10-CM

## 2024-08-02 DIAGNOSIS — M25.442 EFFUSION OF LEFT HAND: Primary | ICD-10-CM

## 2024-08-02 DIAGNOSIS — M62.81 HAND MUSCLE WEAKNESS: ICD-10-CM

## 2024-08-02 DIAGNOSIS — M25.532 BILATERAL WRIST PAIN: ICD-10-CM

## 2024-08-02 DIAGNOSIS — M25.531 BILATERAL WRIST PAIN: ICD-10-CM

## 2024-08-02 DIAGNOSIS — M25.631 STIFFNESS OF RIGHT WRIST JOINT: ICD-10-CM

## 2024-08-02 DIAGNOSIS — Z78.9 DECREASED ACTIVITIES OF DAILY LIVING (ADL): ICD-10-CM

## 2024-08-02 DIAGNOSIS — M25.441 EFFUSION OF RIGHT HAND: ICD-10-CM

## 2024-08-02 PROCEDURE — 97110 THERAPEUTIC EXERCISES: CPT | Performed by: OCCUPATIONAL THERAPIST

## 2024-08-02 PROCEDURE — 97032 APPL MODALITY 1+ESTIM EA 15: CPT | Performed by: OCCUPATIONAL THERAPIST

## 2024-08-02 PROCEDURE — 97010 HOT OR COLD PACKS THERAPY: CPT | Performed by: OCCUPATIONAL THERAPIST

## 2024-08-02 NOTE — PROGRESS NOTES
G[facility] OT [facility] ORTHOPAEDICS  [address] [facility] [address]  Dept: [phone]      Occupational Therapy Progress Note     Referring MD: Friend, Carla NEWTON MD    Diagnosis:     ICD-10-CM    1. Effusion of left hand  M25.442       2. Effusion of right hand  M25.441       3. Stiffness of left wrist joint  M25.632       4. Bilateral wrist pain  M25.531     M25.532       5. Decreased activities of daily living (ADL)  Z78.9       6. Hand muscle weakness  M62.81       7. Stiffness of right wrist joint  M25.631              Surgery/Medical Dx: Date 5/31/24 Open treatment of left two part extra-articular distal radius fracture with internal fixation; Open treatment of right intra-articular distal radius fracture with internal fixation of three or more fragments s/p Closed intra-articular fracture of distal end of right radius; closed intra-articular fracture of distal end of left radius       Therapy precautions: Fracture precautions    History of injury/onset : Pt fell on 5/27/24 injuring bilateral wrists. She was treated in the ER and splinted.        Payor: Payor: BCBS /  /  /  Billing pattern: Commercial- substantial/midpoint time each CPT  Total Direct Treatment Time: 60 min  Total In Office Time: 70 min  Modifier needed: No  Episode visit count:  19     Preferred Name: Kaya    PERTINENT MEDICAL HISTORY     PMHX & Meds:   Past Medical History:   Diagnosis Date    Atrial fibrillation (HCC)     eliquis, metoprolol    Breast cancer (HCC) 2000    right    Flow murmur     5/2023 and 2019 PCP note indicated normal heart sounds    Gastrointestinal disorder     GERD medication    Hyperlipidemia     Hypertension     medication    IFG (impaired fasting glucose)     Pre-diabetes     Radiation therapy complication 2000    Tachycardia    ,   Past Surgical History:   Procedure Laterality Date    BREAST LUMPECTOMY Right 2000    radiation     CHOLECYSTECTOMY  1985    FOREARM SURGERY Bilateral 5/31/2024    OPEN REDUCTION

## 2024-08-02 NOTE — PROGRESS NOTES
GVL OT [facility] ORTHOPAEDICS  [address] [facility] [address]  Dept: [phone]      Occupational Therapy Daily Note     Referring MD: Friend, Carla NEWTON MD    Diagnosis:     ICD-10-CM    1. Effusion of left hand  M25.442       2. Effusion of right hand  M25.441       3. Stiffness of left wrist joint  M25.632       4. Bilateral wrist pain  M25.531     M25.532       5. Decreased activities of daily living (ADL)  Z78.9       6. Hand muscle weakness  M62.81       7. Stiffness of right wrist joint  M25.631              Surgery/Medical Dx: Date 5/31/24 Open treatment of left two part extra-articular distal radius fracture with internal fixation; Open treatment of right intra-articular distal radius fracture with internal fixation of three or more fragments s/p Closed intra-articular fracture of distal end of right radius; closed intra-articular fracture of distal end of left radius       Therapy precautions: Fracture precautions    History of injury/onset : Pt fell on 5/27/24 injuring bilateral wrists. She was treated in the ER and splinted.        Payor: Payor: BCBS /  /  /  Billing pattern: Commercial- substantial/midpoint time each CPT  Total Direct Treatment Time: 60 min  Total In Office Time: 70 min  Modifier needed: No  Episode visit count:  Visit count could not be calculated. Make sure you are using a visit which is associated with an episode.     Preferred Name: Kaya    PERTINENT MEDICAL HISTORY     PMHX & Meds:   Past Medical History:   Diagnosis Date    Atrial fibrillation (HCC)     eliquis, metoprolol    Breast cancer (HCC) 2000    right    Flow murmur     5/2023 and 2019 PCP note indicated normal heart sounds    Gastrointestinal disorder     GERD medication    Hyperlipidemia     Hypertension     medication    IFG (impaired fasting glucose)     Pre-diabetes     Radiation therapy complication 2000    Tachycardia    ,   Past Surgical History:   Procedure Laterality Date    BREAST LUMPECTOMY Right 
to improve ADL's  Pt will have at least 70°  of AROM bilateral forearm supination to improve ability  with tasks like opening doors, hold a plate.  Pt will have at least 70°  of AROM bilateral forearm pronation to improve ability with tasks like writing and placing/acquiring items on tabletop.  Pt will have adequate  strength in bilateral hands to improve ability to grasp and hold an object and open containers  Pt will be able to lift and carry items (ie grocery bags, laundry basket etc) with affected UE pain 2 of 10 or less.  Pt will be able to sleep through the night with no pain in bilateral UE's  Pts Quick DASH functional assessment score will be less than 20% functional deficit        [employer]   Access Code: [email or web address]  URL: [email or web address]  Date: 06/07/2024  Prepared by: Carl Chandra    Exercises  - Seated Elbow Flexion and Extension AROM  - 5 x daily - 7 x weekly - 1 sets - 10 reps - 5 hold  - Seated Forearm Pronation and Supination AROM  - 5 x daily - 7 x weekly - 1 sets - 10 reps - 5 hold  - Wrist Flexion Extension AROM - Palms Down  - 5 x daily - 7 x weekly - 1 sets - 10 reps - 5 hold  - Seated Wrist Radial and Ulnar Deviation AROM  - 5 x daily - 7 x weekly - 1 sets - 10 reps - 5 hold  - Seated Finger Composite Flexion Extension  - 2-3 x daily - 7 x weekly - 1 sets - 10 reps - 5 hold  - Seated Single Digit Intrinsic Stretch  - 2-3 x daily - 7 x weekly - 1 sets - 10 reps - 5 hold  - Finger MP Flexion AROM  - 2-3 x daily - 7 x weekly - 1 sets - 10 reps - 5 hold  - Seated Claw Fist AROM  - 2-3 x daily - 7 x weekly - 1 sets - 10 reps - 5 hold  - Individual Finger Extensions  - 2-3 x daily - 7 x weekly - 1 sets - 10 reps - 5 hold  - Thumb Opposition  - 2-3 x daily - 7 x weekly - 1 sets - 10 reps - 5 hold  - Finger Spreading  - 2-3 x daily - 7 x weekly - 1 sets - 10 reps - 5 hold    Patient Education  - Scar massage and Retrograde Massage  - ASSH Wrist Fracture  - Tubi- size F

## 2024-08-02 NOTE — PROGRESS NOTES
G[facility] OT [facility] ORTHOPAEDICS  [address] [facility] [address]  Dept: [phone]      Occupational Therapy Daily Note     Referring MD: Friend, Carla NEWTON MD    Diagnosis:     ICD-10-CM    1. Effusion of left hand  M25.442       2. Effusion of right hand  M25.441       3. Stiffness of left wrist joint  M25.632       4. Bilateral wrist pain  M25.531     M25.532       5. Decreased activities of daily living (ADL)  Z78.9       6. Hand muscle weakness  M62.81       7. Stiffness of right wrist joint  M25.631              Surgery/Medical Dx: Date 5/31/24 Open treatment of left two part extra-articular distal radius fracture with internal fixation; Open treatment of right intra-articular distal radius fracture with internal fixation of three or more fragments s/p Closed intra-articular fracture of distal end of right radius; closed intra-articular fracture of distal end of left radius       Therapy precautions: Fracture precautions    History of injury/onset : Pt fell on 5/27/24 injuring bilateral wrists. She was treated in the ER and splinted.        Payor: Payor: BCBS /  /  /  Billing pattern: Commercial- substantial/midpoint time each CPT  Total Direct Treatment Time: 60 min  Total In Office Time: 70 min  Modifier needed: No  Episode visit count:  Visit count could not be calculated. Make sure you are using a visit which is associated with an episode.     Preferred Name: Kaya    PERTINENT MEDICAL HISTORY     PMHX & Meds:   Past Medical History:   Diagnosis Date    Atrial fibrillation (HCC)     eliquis, metoprolol    Breast cancer (HCC) 2000    right    Flow murmur     5/2023 and 2019 PCP note indicated normal heart sounds    Gastrointestinal disorder     GERD medication    Hyperlipidemia     Hypertension     medication    IFG (impaired fasting glucose)     Pre-diabetes     Radiation therapy complication 2000    Tachycardia    ,   Past Surgical History:   Procedure Laterality Date    BREAST LUMPECTOMY Right

## 2024-08-05 ENCOUNTER — TREATMENT (OUTPATIENT)
Age: 69
End: 2024-08-05
Payer: COMMERCIAL

## 2024-08-05 DIAGNOSIS — M25.532 BILATERAL WRIST PAIN: ICD-10-CM

## 2024-08-05 DIAGNOSIS — M25.531 BILATERAL WRIST PAIN: ICD-10-CM

## 2024-08-05 DIAGNOSIS — Z78.9 DECREASED ACTIVITIES OF DAILY LIVING (ADL): ICD-10-CM

## 2024-08-05 DIAGNOSIS — M25.632 STIFFNESS OF LEFT WRIST JOINT: ICD-10-CM

## 2024-08-05 DIAGNOSIS — M25.442 EFFUSION OF LEFT HAND: Primary | ICD-10-CM

## 2024-08-05 DIAGNOSIS — M25.631 STIFFNESS OF RIGHT WRIST JOINT: ICD-10-CM

## 2024-08-05 DIAGNOSIS — M25.441 EFFUSION OF RIGHT HAND: ICD-10-CM

## 2024-08-05 DIAGNOSIS — M62.81 HAND MUSCLE WEAKNESS: ICD-10-CM

## 2024-08-05 PROCEDURE — M5041: HCPCS | Performed by: OCCUPATIONAL THERAPIST

## 2024-08-05 PROCEDURE — 97032 APPL MODALITY 1+ESTIM EA 15: CPT | Performed by: OCCUPATIONAL THERAPIST

## 2024-08-05 PROCEDURE — 97110 THERAPEUTIC EXERCISES: CPT | Performed by: OCCUPATIONAL THERAPIST

## 2024-08-05 PROCEDURE — 97010 HOT OR COLD PACKS THERAPY: CPT | Performed by: OCCUPATIONAL THERAPIST

## 2024-08-06 DIAGNOSIS — S52.571A OTHER CLOSED INTRA-ARTICULAR FRACTURE OF DISTAL END OF RIGHT RADIUS, INITIAL ENCOUNTER: Primary | ICD-10-CM

## 2024-08-07 ENCOUNTER — TREATMENT (OUTPATIENT)
Age: 69
End: 2024-08-07
Payer: COMMERCIAL

## 2024-08-07 DIAGNOSIS — M25.441 EFFUSION OF RIGHT HAND: ICD-10-CM

## 2024-08-07 DIAGNOSIS — Z78.9 DECREASED ACTIVITIES OF DAILY LIVING (ADL): ICD-10-CM

## 2024-08-07 DIAGNOSIS — M25.442 EFFUSION OF LEFT HAND: Primary | ICD-10-CM

## 2024-08-07 DIAGNOSIS — M25.532 BILATERAL WRIST PAIN: ICD-10-CM

## 2024-08-07 DIAGNOSIS — M25.531 BILATERAL WRIST PAIN: ICD-10-CM

## 2024-08-07 DIAGNOSIS — M62.81 HAND MUSCLE WEAKNESS: ICD-10-CM

## 2024-08-07 DIAGNOSIS — M25.631 STIFFNESS OF RIGHT WRIST JOINT: ICD-10-CM

## 2024-08-07 DIAGNOSIS — M25.632 STIFFNESS OF LEFT WRIST JOINT: ICD-10-CM

## 2024-08-07 PROCEDURE — 97010 HOT OR COLD PACKS THERAPY: CPT | Performed by: OCCUPATIONAL THERAPIST

## 2024-08-07 PROCEDURE — 97018 PARAFFIN BATH THERAPY: CPT | Performed by: OCCUPATIONAL THERAPIST

## 2024-08-07 PROCEDURE — 97110 THERAPEUTIC EXERCISES: CPT | Performed by: OCCUPATIONAL THERAPIST

## 2024-08-07 NOTE — PROGRESS NOTES
G[facility] OT [facility] ORTHOPAEDICS  [address] [facility] [address]  Dept: [phone]      Occupational Therapy Daily Note     Referring MD: Friend, Carla NEWTON MD    Diagnosis:     ICD-10-CM    1. Effusion of left hand  M25.442       2. Effusion of right hand  M25.441       3. Stiffness of left wrist joint  M25.632       4. Bilateral wrist pain  M25.531     M25.532       5. Decreased activities of daily living (ADL)  Z78.9       6. Hand muscle weakness  M62.81       7. Stiffness of right wrist joint  M25.631              Surgery/Medical Dx: Date 5/31/24 Open treatment of left two part extra-articular distal radius fracture with internal fixation; Open treatment of right intra-articular distal radius fracture with internal fixation of three or more fragments s/p Closed intra-articular fracture of distal end of right radius; closed intra-articular fracture of distal end of left radius       Therapy precautions: Fracture precautions    History of injury/onset : Pt fell on 5/27/24 injuring bilateral wrists. She was treated in the ER and splinted.        Payor: Payor: BCBS /  /  /  Billing pattern: Commercial- substantial/midpoint time each CPT  Total Direct Treatment Time: 50 min  Total In Office Time: 60 min  Modifier needed: No  Episode visit count:  20     Preferred Name: Kaya    PERTINENT MEDICAL HISTORY     PMHX & Meds:   Past Medical History:   Diagnosis Date    Atrial fibrillation (HCC)     eliquis, metoprolol    Breast cancer (HCC) 2000    right    Flow murmur     5/2023 and 2019 PCP note indicated normal heart sounds    Gastrointestinal disorder     GERD medication    Hyperlipidemia     Hypertension     medication    IFG (impaired fasting glucose)     Pre-diabetes     Radiation therapy complication 2000    Tachycardia    ,   Past Surgical History:   Procedure Laterality Date    BREAST LUMPECTOMY Right 2000    radiation     CHOLECYSTECTOMY  1985    FOREARM SURGERY Bilateral 5/31/2024    OPEN REDUCTION

## 2024-08-14 ENCOUNTER — TREATMENT (OUTPATIENT)
Age: 69
End: 2024-08-14
Payer: COMMERCIAL

## 2024-08-14 DIAGNOSIS — M25.441 EFFUSION OF RIGHT HAND: ICD-10-CM

## 2024-08-14 DIAGNOSIS — M25.442 EFFUSION OF LEFT HAND: Primary | ICD-10-CM

## 2024-08-14 DIAGNOSIS — M25.632 STIFFNESS OF LEFT WRIST JOINT: ICD-10-CM

## 2024-08-14 DIAGNOSIS — Z78.9 DECREASED ACTIVITIES OF DAILY LIVING (ADL): ICD-10-CM

## 2024-08-14 DIAGNOSIS — M25.531 BILATERAL WRIST PAIN: ICD-10-CM

## 2024-08-14 DIAGNOSIS — M25.532 BILATERAL WRIST PAIN: ICD-10-CM

## 2024-08-14 DIAGNOSIS — M62.81 HAND MUSCLE WEAKNESS: ICD-10-CM

## 2024-08-14 PROCEDURE — 97110 THERAPEUTIC EXERCISES: CPT | Performed by: OCCUPATIONAL THERAPIST

## 2024-08-14 PROCEDURE — 97018 PARAFFIN BATH THERAPY: CPT | Performed by: OCCUPATIONAL THERAPIST

## 2024-08-14 PROCEDURE — 97140 MANUAL THERAPY 1/> REGIONS: CPT | Performed by: OCCUPATIONAL THERAPIST

## 2024-08-16 ENCOUNTER — TREATMENT (OUTPATIENT)
Age: 69
End: 2024-08-16
Payer: COMMERCIAL

## 2024-08-16 DIAGNOSIS — M25.631 STIFFNESS OF RIGHT WRIST JOINT: ICD-10-CM

## 2024-08-16 DIAGNOSIS — M62.81 HAND MUSCLE WEAKNESS: ICD-10-CM

## 2024-08-16 DIAGNOSIS — M25.532 BILATERAL WRIST PAIN: ICD-10-CM

## 2024-08-16 DIAGNOSIS — M25.442 EFFUSION OF LEFT HAND: Primary | ICD-10-CM

## 2024-08-16 DIAGNOSIS — M25.632 STIFFNESS OF LEFT WRIST JOINT: ICD-10-CM

## 2024-08-16 DIAGNOSIS — M25.531 BILATERAL WRIST PAIN: ICD-10-CM

## 2024-08-16 DIAGNOSIS — Z78.9 DECREASED ACTIVITIES OF DAILY LIVING (ADL): ICD-10-CM

## 2024-08-16 DIAGNOSIS — M25.441 EFFUSION OF RIGHT HAND: ICD-10-CM

## 2024-08-16 PROCEDURE — 97010 HOT OR COLD PACKS THERAPY: CPT | Performed by: OCCUPATIONAL THERAPIST

## 2024-08-16 PROCEDURE — 97140 MANUAL THERAPY 1/> REGIONS: CPT | Performed by: OCCUPATIONAL THERAPIST

## 2024-08-16 PROCEDURE — 97110 THERAPEUTIC EXERCISES: CPT | Performed by: OCCUPATIONAL THERAPIST

## 2024-08-16 NOTE — PROGRESS NOTES
G[facility] OT [facility] ORTHOPAEDICS  [address] [facility] [address]  Dept: [phone]      Occupational Therapy Daily Note     Referring MD: Friend, Carla NEWTON MD    Diagnosis:     ICD-10-CM    1. Effusion of left hand  M25.442       2. Effusion of right hand  M25.441       3. Bilateral wrist pain  M25.531     M25.532       4. Stiffness of left wrist joint  M25.632       5. Hand muscle weakness  M62.81       6. Decreased activities of daily living (ADL)  Z78.9       7. Stiffness of right wrist joint  M25.631                Surgery/Medical Dx: Date 5/31/24 Open treatment of left two part extra-articular distal radius fracture with internal fixation; Open treatment of right intra-articular distal radius fracture with internal fixation of three or more fragments s/p Closed intra-articular fracture of distal end of right radius; closed intra-articular fracture of distal end of left radius       Therapy precautions: Fracture precautions    History of injury/onset : Pt fell on 5/27/24 injuring bilateral wrists. She was treated in the ER and splinted.        Payor: Payor: BCBS /  /  /  Billing pattern: Commercial- substantial/midpoint time each CPT  Total Direct Treatment Time:  55 min  Total In Office Time: 65 min  Modifier needed: No  Episode visit count:  22     Preferred Name: Kaya    PERTINENT MEDICAL HISTORY     PMHX & Meds:   Past Medical History:   Diagnosis Date    Atrial fibrillation (HCC)     eliquis, metoprolol    Breast cancer (HCC) 2000    right    Flow murmur     5/2023 and 2019 PCP note indicated normal heart sounds    Gastrointestinal disorder     GERD medication    Hyperlipidemia     Hypertension     medication    IFG (impaired fasting glucose)     Pre-diabetes     Radiation therapy complication 2000    Tachycardia    ,   Past Surgical History:   Procedure Laterality Date    BREAST LUMPECTOMY Right 2000    radiation     CHOLECYSTECTOMY  1985    FOREARM SURGERY Bilateral 5/31/2024    OPEN REDUCTION

## 2024-08-19 ENCOUNTER — TREATMENT (OUTPATIENT)
Age: 69
End: 2024-08-19
Payer: COMMERCIAL

## 2024-08-19 DIAGNOSIS — M25.532 BILATERAL WRIST PAIN: ICD-10-CM

## 2024-08-19 DIAGNOSIS — M25.442 EFFUSION OF LEFT HAND: Primary | ICD-10-CM

## 2024-08-19 DIAGNOSIS — M25.441 EFFUSION OF RIGHT HAND: ICD-10-CM

## 2024-08-19 DIAGNOSIS — M25.631 STIFFNESS OF RIGHT WRIST JOINT: ICD-10-CM

## 2024-08-19 DIAGNOSIS — M62.81 HAND MUSCLE WEAKNESS: ICD-10-CM

## 2024-08-19 DIAGNOSIS — Z78.9 DECREASED ACTIVITIES OF DAILY LIVING (ADL): ICD-10-CM

## 2024-08-19 DIAGNOSIS — M25.632 STIFFNESS OF LEFT WRIST JOINT: ICD-10-CM

## 2024-08-19 DIAGNOSIS — M25.531 BILATERAL WRIST PAIN: ICD-10-CM

## 2024-08-19 PROCEDURE — 97110 THERAPEUTIC EXERCISES: CPT | Performed by: OCCUPATIONAL THERAPIST

## 2024-08-19 PROCEDURE — 97018 PARAFFIN BATH THERAPY: CPT | Performed by: OCCUPATIONAL THERAPIST

## 2024-08-19 PROCEDURE — 97010 HOT OR COLD PACKS THERAPY: CPT | Performed by: OCCUPATIONAL THERAPIST

## 2024-08-19 NOTE — PROGRESS NOTES
G[facility] OT [facility] ORTHOPAEDICS  [address] [facility] [address]  Dept: [phone]      Occupational Therapy Daily Note     Referring MD: Friend, Carla NEWTON MD    Diagnosis:     ICD-10-CM    1. Effusion of left hand  M25.442       2. Effusion of right hand  M25.441       3. Bilateral wrist pain  M25.531     M25.532       4. Stiffness of left wrist joint  M25.632       5. Hand muscle weakness  M62.81       6. Decreased activities of daily living (ADL)  Z78.9       7. Stiffness of right wrist joint  M25.631                Surgery/Medical Dx: Date 5/31/24 Open treatment of left two part extra-articular distal radius fracture with internal fixation; Open treatment of right intra-articular distal radius fracture with internal fixation of three or more fragments s/p Closed intra-articular fracture of distal end of right radius; closed intra-articular fracture of distal end of left radius       Therapy precautions: Fracture precautions    History of injury/onset : Pt fell on 5/27/24 injuring bilateral wrists. She was treated in the ER and splinted.        Payor: Payor: BCBS /  /  /  Billing pattern: Commercial- substantial/midpoint time each CPT  Total Direct Treatment Time:  75 min  Total In Office Time: 75 min  Modifier needed: No  Episode visit count:  23     Preferred Name: Kaya    PERTINENT MEDICAL HISTORY     PMHX & Meds:   Past Medical History:   Diagnosis Date    Atrial fibrillation (HCC)     eliquis, metoprolol    Breast cancer (HCC) 2000    right    Flow murmur     5/2023 and 2019 PCP note indicated normal heart sounds    Gastrointestinal disorder     GERD medication    Hyperlipidemia     Hypertension     medication    IFG (impaired fasting glucose)     Pre-diabetes     Radiation therapy complication 2000    Tachycardia    ,   Past Surgical History:   Procedure Laterality Date    BREAST LUMPECTOMY Right 2000    radiation     CHOLECYSTECTOMY  1985    FOREARM SURGERY Bilateral 5/31/2024    OPEN REDUCTION

## 2024-08-22 ENCOUNTER — OFFICE VISIT (OUTPATIENT)
Age: 69
End: 2024-08-22

## 2024-08-22 ENCOUNTER — TREATMENT (OUTPATIENT)
Age: 69
End: 2024-08-22
Payer: COMMERCIAL

## 2024-08-22 DIAGNOSIS — M25.631 STIFFNESS OF RIGHT WRIST JOINT: ICD-10-CM

## 2024-08-22 DIAGNOSIS — S52.571A OTHER CLOSED INTRA-ARTICULAR FRACTURE OF DISTAL END OF RIGHT RADIUS, INITIAL ENCOUNTER: ICD-10-CM

## 2024-08-22 DIAGNOSIS — M62.81 HAND MUSCLE WEAKNESS: ICD-10-CM

## 2024-08-22 DIAGNOSIS — M25.441 EFFUSION OF RIGHT HAND: ICD-10-CM

## 2024-08-22 DIAGNOSIS — Z78.9 DECREASED ACTIVITIES OF DAILY LIVING (ADL): ICD-10-CM

## 2024-08-22 DIAGNOSIS — M25.442 EFFUSION OF LEFT HAND: Primary | ICD-10-CM

## 2024-08-22 DIAGNOSIS — M25.532 BILATERAL WRIST PAIN: ICD-10-CM

## 2024-08-22 DIAGNOSIS — M25.632 STIFFNESS OF LEFT WRIST JOINT: ICD-10-CM

## 2024-08-22 DIAGNOSIS — S52.572A OTHER CLOSED INTRA-ARTICULAR FRACTURE OF DISTAL END OF LEFT RADIUS, INITIAL ENCOUNTER: Primary | ICD-10-CM

## 2024-08-22 DIAGNOSIS — M25.531 BILATERAL WRIST PAIN: ICD-10-CM

## 2024-08-22 PROCEDURE — 97018 PARAFFIN BATH THERAPY: CPT | Performed by: OCCUPATIONAL THERAPIST

## 2024-08-22 PROCEDURE — 99024 POSTOP FOLLOW-UP VISIT: CPT | Performed by: ORTHOPAEDIC SURGERY

## 2024-08-22 PROCEDURE — 97110 THERAPEUTIC EXERCISES: CPT | Performed by: OCCUPATIONAL THERAPIST

## 2024-08-22 NOTE — PROGRESS NOTES
G[facility] OT [facility] ORTHOPAEDICS  [address] [facility] [address]  Dept: [phone]      Occupational Therapy Daily Note     Referring MD: Friend, Carla NEWTON MD    Diagnosis:     ICD-10-CM    1. Effusion of left hand  M25.442       2. Effusion of right hand  M25.441       3. Bilateral wrist pain  M25.531     M25.532       4. Stiffness of left wrist joint  M25.632       5. Decreased activities of daily living (ADL)  Z78.9       6. Hand muscle weakness  M62.81       7. Stiffness of right wrist joint  M25.631                Surgery/Medical Dx: Date 5/31/24 Open treatment of left two part extra-articular distal radius fracture with internal fixation; Open treatment of right intra-articular distal radius fracture with internal fixation of three or more fragments s/p Closed intra-articular fracture of distal end of right radius; closed intra-articular fracture of distal end of left radius       Therapy precautions: Fracture precautions    History of injury/onset : Pt fell on 5/27/24 injuring bilateral wrists. She was treated in the ER and splinted.        Payor: Payor: BCBS /  /  /  Billing pattern: Commercial- substantial/midpoint time each CPT  Total Direct Treatment Time:  45 min  Total In Office Time: 45 min  Modifier needed: No  Episode visit count:  24     Preferred Name: Kaya    PERTINENT MEDICAL HISTORY     PMHX & Meds:   Past Medical History:   Diagnosis Date    Atrial fibrillation (HCC)     eliquis, metoprolol    Breast cancer (HCC) 2000    right    Flow murmur     5/2023 and 2019 PCP note indicated normal heart sounds    Gastrointestinal disorder     GERD medication    Hyperlipidemia     Hypertension     medication    IFG (impaired fasting glucose)     Pre-diabetes     Radiation therapy complication 2000    Tachycardia    ,   Past Surgical History:   Procedure Laterality Date    BREAST LUMPECTOMY Right 2000    radiation     CHOLECYSTECTOMY  1985    FOREARM SURGERY Bilateral 5/31/2024    OPEN REDUCTION

## 2024-08-25 NOTE — PROGRESS NOTES
G[facility] OT [facility] ORTHOPAEDICS  [address] [facility] [address]  Dept: [phone]      Occupational Therapy Daily Note     Referring MD: Friend, Carla NEWTON MD    Diagnosis:     ICD-10-CM    1. Effusion of left hand  M25.442       2. Effusion of right hand  M25.441       3. Bilateral wrist pain  M25.531     M25.532       4. Stiffness of left wrist joint  M25.632       5. Decreased activities of daily living (ADL)  Z78.9       6. Stiffness of right wrist joint  M25.631       7. Hand muscle weakness  M62.81                Surgery/Medical Dx: Date 5/31/24 Open treatment of left two part extra-articular distal radius fracture with internal fixation; Open treatment of right intra-articular distal radius fracture with internal fixation of three or more fragments s/p Closed intra-articular fracture of distal end of right radius; closed intra-articular fracture of distal end of left radius       Therapy precautions: Fracture precautions    History of injury/onset : Pt fell on 5/27/24 injuring bilateral wrists. She was treated in the ER and splinted.        Payor: Payor: BCBS /  /  /  Billing pattern: Commercial- substantial/midpoint time each CPT  Total Direct Treatment Time:  65 min  Total In Office Time: 65 min  Modifier needed: No  Episode visit count:  25     Preferred Name: Kaya    PERTINENT MEDICAL HISTORY     PMHX & Meds:   Past Medical History:   Diagnosis Date    Atrial fibrillation (HCC)     eliquis, metoprolol    Breast cancer (HCC) 2000    right    Flow murmur     5/2023 and 2019 PCP note indicated normal heart sounds    Gastrointestinal disorder     GERD medication    Hyperlipidemia     Hypertension     medication    IFG (impaired fasting glucose)     Pre-diabetes     Radiation therapy complication 2000    Tachycardia    ,   Past Surgical History:   Procedure Laterality Date    BREAST LUMPECTOMY Right 2000    radiation     CHOLECYSTECTOMY  1985    FOREARM SURGERY Bilateral 5/31/2024    OPEN REDUCTION  x weekly - 1 sets - 10 reps - 5 hold  - Finger Spreading  - 2-3 x daily - 7 x weekly - 1 sets - 10 reps - 5 hold    Patient Education  - Scar massage and Retrograde Massage  - ASSH Wrist Fracture  - Tubi- size F for swelling    Access Code: [email or web address]  URL: [email or web address]  Date: 06/28/2024  Prepared by: Carl Chandra    Exercises  -  Seated Wrist Prayer Stretch  - 4 x daily - 7 x weekly - 1 sets - 10 reps - 5 hold    Access Code: [email or web address]  URL: [email or web address]  Date: 07/15/2024  Prepared by: Carl Chandra    Exercises  - Forearm Supination Stretch  - 5 x daily - 7 x weekly - 1 sets - 10 reps - 5 hold  - Seated Wrist Flexion with Overpressure  - 5 x daily - 7 x weekly - 1 sets - 10 reps - 5 hold  - Seated Wrist Flexion Stretch  - 5 x daily - 7 x weekly - 1 sets - 10 reps - 5 hold    Access Code: [email or web address]  URL: [email or web address]  Date: 07/17/2024  Prepared by: Carl Chandra    Exercises  - Seated Wrist Extension with 1# Dumbbell  - 3 x daily - 7 x weekly - 1 sets - 10 reps - 5 hold  - Seated Wrist Flexion with 1# Dumbbell  - 3 x daily - 7 x weekly - 1 sets - 10 reps - 5 hold  - Seated Wrist Radial Deviation with 1# Dumbbell  - 3 x daily - 7 x weekly - 1 sets - 10 reps - 5 hold  - Forearm Pronation and Supination with Hammer  - 3 x daily - 7 x weekly - 1 sets - 10 reps - 5 hold      Repeat these 7-8x per day     Weighted stretches with weight for wrist flexion    Reaching for \"grape\" or cotton ball with your thumb and little finger and palm down, rotate palm so palm is facing mouth, then your ear. (For pro/sup)  Don't rotate your head.     Table and prayer stretches    Make a fist, flex and extend wrists    OT Protocols

## 2024-08-26 ENCOUNTER — TREATMENT (OUTPATIENT)
Age: 69
End: 2024-08-26
Payer: COMMERCIAL

## 2024-08-26 DIAGNOSIS — M25.631 STIFFNESS OF RIGHT WRIST JOINT: ICD-10-CM

## 2024-08-26 DIAGNOSIS — M25.441 EFFUSION OF RIGHT HAND: ICD-10-CM

## 2024-08-26 DIAGNOSIS — M25.532 BILATERAL WRIST PAIN: ICD-10-CM

## 2024-08-26 DIAGNOSIS — M25.442 EFFUSION OF LEFT HAND: Primary | ICD-10-CM

## 2024-08-26 DIAGNOSIS — Z78.9 DECREASED ACTIVITIES OF DAILY LIVING (ADL): ICD-10-CM

## 2024-08-26 DIAGNOSIS — M25.632 STIFFNESS OF LEFT WRIST JOINT: ICD-10-CM

## 2024-08-26 DIAGNOSIS — M62.81 HAND MUSCLE WEAKNESS: ICD-10-CM

## 2024-08-26 DIAGNOSIS — M25.531 BILATERAL WRIST PAIN: ICD-10-CM

## 2024-08-26 PROCEDURE — 97110 THERAPEUTIC EXERCISES: CPT | Performed by: OCCUPATIONAL THERAPIST

## 2024-08-26 PROCEDURE — 97018 PARAFFIN BATH THERAPY: CPT | Performed by: OCCUPATIONAL THERAPIST

## 2024-08-26 PROCEDURE — 97140 MANUAL THERAPY 1/> REGIONS: CPT | Performed by: OCCUPATIONAL THERAPIST

## 2024-08-27 NOTE — PROGRESS NOTES
Orthopaedic Hand Surgery Note    Name: Anne Marie Solis  Age: 69 y.o.  YOB: 1955  Gender: female  MRN: [id number]    Post Operative Visit: Open Reduction Internal Fixation Of Bilateral Distal Radius Fractures - Bilateral    HPI: Patient is status post Open Reduction Internal Fixation Of Bilateral Distal Radius Fractures - Bilateral on 5/31/2024. She has been attending therapy, but still has significantly limited range of motion    Physical Examination:  Wounds healed well. Sensation is intact in all fingers. Motor exam reveals no deficits. good finger range of motion. Wrist range of motion is still extremely limited. She has about 20 degrees of wrist flexion and extension on the right. She has about 20 degrees of extension on the left, 40 degrees of flexion. Supination and pronation have improved since her last visit    Imaging:     Wrist  XR: AP, Lateral, Oblique and wrist facet view     Clinical Indication:  1. Other closed intra-articular fracture of distal end of left radius, initial encounter    2. Other closed intra-articular fracture of distal end of right radius, initial encounter         Report: AP, lateral, oblique and wrist facet x-ray on the bilateral demonstrates distal radius fracture status post plate and screw osteosynthesis in acceptable alignment, no hardware complication is noted    Impression: Distal radius fracture status post osteosynthesis as described above     Carla Colón MD     Assessment:   1. Other closed intra-articular fracture of distal end of left radius, initial encounter    2. Other closed intra-articular fracture of distal end of right radius, initial encounter         Status post Open Reduction Internal Fixation Of Bilateral Distal Radius Fractures - Bilateral on 5/31/2024    Plan:  We discussed the treatment and therapy plan. Her range of motion is still extremely limited. She will continue therapy for active and passive range of motion, as well as

## 2024-08-29 ENCOUNTER — TREATMENT (OUTPATIENT)
Age: 69
End: 2024-08-29
Payer: COMMERCIAL

## 2024-08-29 DIAGNOSIS — M25.531 BILATERAL WRIST PAIN: ICD-10-CM

## 2024-08-29 DIAGNOSIS — M25.442 EFFUSION OF LEFT HAND: Primary | ICD-10-CM

## 2024-08-29 DIAGNOSIS — M62.81 HAND MUSCLE WEAKNESS: ICD-10-CM

## 2024-08-29 DIAGNOSIS — M25.632 STIFFNESS OF LEFT WRIST JOINT: ICD-10-CM

## 2024-08-29 DIAGNOSIS — M25.631 STIFFNESS OF RIGHT WRIST JOINT: ICD-10-CM

## 2024-08-29 DIAGNOSIS — M25.532 BILATERAL WRIST PAIN: ICD-10-CM

## 2024-08-29 DIAGNOSIS — Z78.9 DECREASED ACTIVITIES OF DAILY LIVING (ADL): ICD-10-CM

## 2024-08-29 DIAGNOSIS — M25.441 EFFUSION OF RIGHT HAND: ICD-10-CM

## 2024-08-29 PROCEDURE — 97140 MANUAL THERAPY 1/> REGIONS: CPT | Performed by: OCCUPATIONAL THERAPIST

## 2024-08-29 PROCEDURE — 97018 PARAFFIN BATH THERAPY: CPT | Performed by: OCCUPATIONAL THERAPIST

## 2024-08-29 PROCEDURE — 97110 THERAPEUTIC EXERCISES: CPT | Performed by: OCCUPATIONAL THERAPIST

## 2024-08-29 NOTE — PROGRESS NOTES
[facility] ORTHOPAEDICS  [address] [address]  Dept: [phone]      Occupational Therapy Daily Note     Referring MD: Friend, Carla NEWTON MD    Diagnosis:     ICD-10-CM    1. Effusion of left hand  M25.442       2. Effusion of right hand  M25.441       3. Bilateral wrist pain  M25.531     M25.532       4. Stiffness of left wrist joint  M25.632       5. Decreased activities of daily living (ADL)  Z78.9       6. Stiffness of right wrist joint  M25.631       7. Hand muscle weakness  M62.81                Surgery/Medical Dx: Date 5/31/24 Open treatment of left two part extra-articular distal radius fracture with internal fixation; Open treatment of right intra-articular distal radius fracture with internal fixation of three or more fragments s/p Closed intra-articular fracture of distal end of right radius; closed intra-articular fracture of distal end of left radius       Therapy precautions: Fracture precautions    History of injury/onset : Pt fell on 5/27/24 injuring bilateral wrists. She was treated in the ER and splinted.        Payor: Payor: BC[employer] /  /  /  Billing pattern: Commercial- substantial/midpoint time each CPT  Total Direct Treatment Time:  70 min  Total In Office Time: 70 min  Modifier needed: No  Episode visit count:  26     Preferred Name: Kaya    PERTINENT MEDICAL HISTORY     PMHX & Meds:   Past Medical History:   Diagnosis Date    Atrial fibrillation (HCC)     eliquis, metoprolol    Breast cancer (HCC) 2000    right    Flow murmur     5/2023 and 2019 PCP note indicated normal heart sounds    Gastrointestinal disorder     GERD medication    Hyperlipidemia     Hypertension     medication    IFG (impaired fasting glucose)     Pre-diabetes     Radiation therapy complication 2000    Tachycardia    ,   Past Surgical History:   Procedure Laterality Date    BREAST LUMPECTOMY Right 2000    radiation     CHOLECYSTECTOMY  1985    FOREARM SURGERY Bilateral 5/31/2024    OPEN REDUCTION    Wrist ext/flex       37/30      31/35 44/30   35/32 37/30 35/22   RD/UD                 RIGHT  (AROM)   IE LEFT  (AROM)  IE Right  8/5/24 Left  8/5/24    Thumb MP ext/flex 0/40 0/45     Thumb IP ext/flex 0/20 0/32     Radial add/abduction 43 50     Palmar add/abduction 40 45     Opposition (Kapandji): 6 8 7    9     Digital ROM: IE RIGHT IF LF RF SF   AROM    MCP       AROM      PIP       AROM      DIP       Active flexion to DPC IE  2.7     Active flexion to DPC  7/17  2.0         Digital ROM: IE LEFT IF LF RF SF   AROM    MCP       AROM      PIP       AROM      DIP       Active flexion to DPC  0     Passive flexion to DPC       Strength:  strength: right 15 psi; left 20 psi  Right 20 psi; left 23 psi (8/5/24)      Treatment:    Manual Therapy (08048) x 10 minutes: Addressing soft tissue/joint restrictions and swelling of bilateral hands/wrists/forearms  (5 min each)  Scar massage/mobilization   Grade I/II MP /IP joint mobs R hand digits    Therapeutic exercise (77552) x 60  min:  Bilateral UE's   Paraffin (07354) x 10 minutes to right hand digits/wrist with flexion stretch using Coban and therapist providing overpressure to MP joints and hot pack to left hand/wrist          Sustained AA/PROM Supination/ WE/WF bilaterally  --  Weighted wrist flex stretch, 2 lb wt, 10 sec hold x 10 reps   - Weighted supination stretch with 1 pound weight  - Active supination tasks bilaterally  - Cone push into yellow putty with WE/WF  Theraputty, yellow, with large knob (simulating door knob) and small knob (water bottle cap)  Standing wrist extension stretches  Flexbar, (yellow) pro/sup, 20 each  Large red ball, rotating to promote pro/sup, 10 reps  Small medicine ball, tosses to incorporate wrist flex/ext and forearm sup/pro, 10 each  Large red ball tosses, 10  2 lb wts with UE's in supination by side, rotating to pronation to overhead shelf, 10      CLINICAL DECISION MAKING/ASSESSMENT   Pt continues to demonstrate improved

## 2024-08-31 NOTE — PROGRESS NOTES
[facility] ORTHOPAEDICS  [address] [place] [id number]  Dept: [phone]      Occupational Therapy Daily Note     Referring MD: Friend, Carla NEWTON MD    Diagnosis:     ICD-10-CM    1. Effusion of left hand  M25.442       2. Effusion of right hand  M25.441       3. Stiffness of left wrist joint  M25.632       4. Bilateral wrist pain  M25.531     M25.532       5. Decreased activities of daily living (ADL)  Z78.9       6. Hand muscle weakness  M62.81                Surgery/Medical Dx: Date 5/31/24 Open treatment of left two part extra-articular distal radius fracture with internal fixation; Open treatment of right intra-articular distal radius fracture with internal fixation of three or more fragments s/p Closed intra-articular fracture of distal end of right radius; closed intra-articular fracture of distal end of left radius       Therapy precautions: Fracture precautions    History of injury/onset : Pt fell on 5/27/24 injuring bilateral wrists. She was treated in the ER and splinted.        Payor: Payor: BCBS /  /  /  Billing pattern: Commercial- substantial/midpoint time each CPT  Total Direct Treatment Time:  75 min  Total In Office Time: 75 min  Modifier needed: No  Episode visit count:  21     Preferred Name: Kaya    PERTINENT MEDICAL HISTORY     PMHX & Meds:   Past Medical History:   Diagnosis Date    Atrial fibrillation (HCC)     eliquis, metoprolol    Breast cancer (HCC) 2000    right    Flow murmur     5/2023 and 2019 PCP note indicated normal heart sounds    Gastrointestinal disorder     GERD medication    Hyperlipidemia     Hypertension     medication    IFG (impaired fasting glucose)     Pre-diabetes     Radiation therapy complication 2000    Tachycardia    ,   Past Surgical History:   Procedure Laterality Date    BREAST LUMPECTOMY Right 2000    radiation     CHOLECYSTECTOMY  1985    FOREARM SURGERY Bilateral 5/31/2024    OPEN REDUCTION INTERNAL FIXATION OF BILATERAL DISTAL RADIUS  Imaging Studies

## 2024-09-03 NOTE — PROGRESS NOTES
[facility] OT [facility] ORTHOPAEDICS  [address] [facility] [address]  Dept: [phone]      Occupational Therapy Daily Note     Referring MD: Friend, Carla NEWTON MD    Diagnosis:     ICD-10-CM    1. Effusion of left hand  M25.442       2. Effusion of right hand  M25.441       3. Bilateral wrist pain  M25.531     M25.532       4. Stiffness of left wrist joint  M25.632       5. Decreased activities of daily living (ADL)  Z78.9       6. Stiffness of right wrist joint  M25.631       7. Hand muscle weakness  M62.81                Surgery/Medical Dx: Date 5/31/24 Open treatment of left two part extra-articular distal radius fracture with internal fixation; Open treatment of right intra-articular distal radius fracture with internal fixation of three or more fragments s/p Closed intra-articular fracture of distal end of right radius; closed intra-articular fracture of distal end of left radius       Therapy precautions: Fracture precautions    History of injury/onset : Pt fell on 5/27/24 injuring bilateral wrists. She was treated in the ER and splinted.        Payor: Payor: BCBS /  /  /  Billing pattern: Commercial- substantial/midpoint time each CPT  Total Direct Treatment Time:  65 min  Total In Office Time: 65 min  Modifier needed: No  Episode visit count:  27     Preferred Name: Kaya    PERTINENT MEDICAL HISTORY     PMHX & Meds:   Past Medical History:   Diagnosis Date    Atrial fibrillation (HCC)     eliquis, metoprolol    Breast cancer (HCC) 2000    right    Flow murmur     5/2023 and 2019 PCP note indicated normal heart sounds    Gastrointestinal disorder     GERD medication    Hyperlipidemia     Hypertension     medication    IFG (impaired fasting glucose)     Pre-diabetes     Radiation therapy complication 2000    Tachycardia    ,   Past Surgical History:   Procedure Laterality Date    BREAST LUMPECTOMY Right 2000    radiation     CHOLECYSTECTOMY  1985    FOREARM SURGERY Bilateral 5/31/2024    OPEN REDUCTION

## 2024-09-04 ENCOUNTER — TREATMENT (OUTPATIENT)
Age: 69
End: 2024-09-04
Payer: COMMERCIAL

## 2024-09-04 DIAGNOSIS — M25.441 EFFUSION OF RIGHT HAND: ICD-10-CM

## 2024-09-04 DIAGNOSIS — M25.442 EFFUSION OF LEFT HAND: Primary | ICD-10-CM

## 2024-09-04 DIAGNOSIS — M25.532 BILATERAL WRIST PAIN: ICD-10-CM

## 2024-09-04 DIAGNOSIS — M25.632 STIFFNESS OF LEFT WRIST JOINT: ICD-10-CM

## 2024-09-04 DIAGNOSIS — M25.531 BILATERAL WRIST PAIN: ICD-10-CM

## 2024-09-04 DIAGNOSIS — M62.81 HAND MUSCLE WEAKNESS: ICD-10-CM

## 2024-09-04 DIAGNOSIS — M25.631 STIFFNESS OF RIGHT WRIST JOINT: ICD-10-CM

## 2024-09-04 DIAGNOSIS — Z78.9 DECREASED ACTIVITIES OF DAILY LIVING (ADL): ICD-10-CM

## 2024-09-04 PROCEDURE — 97018 PARAFFIN BATH THERAPY: CPT | Performed by: OCCUPATIONAL THERAPIST

## 2024-09-04 PROCEDURE — 97110 THERAPEUTIC EXERCISES: CPT | Performed by: OCCUPATIONAL THERAPIST

## 2024-09-06 ENCOUNTER — TREATMENT (OUTPATIENT)
Age: 69
End: 2024-09-06

## 2024-09-06 DIAGNOSIS — M25.442 EFFUSION OF LEFT HAND: Primary | ICD-10-CM

## 2024-09-06 DIAGNOSIS — M25.631 STIFFNESS OF RIGHT WRIST JOINT: ICD-10-CM

## 2024-09-06 DIAGNOSIS — M25.532 BILATERAL WRIST PAIN: ICD-10-CM

## 2024-09-06 DIAGNOSIS — M25.632 STIFFNESS OF LEFT WRIST JOINT: ICD-10-CM

## 2024-09-06 DIAGNOSIS — M25.441 EFFUSION OF RIGHT HAND: ICD-10-CM

## 2024-09-06 DIAGNOSIS — M62.81 HAND MUSCLE WEAKNESS: ICD-10-CM

## 2024-09-06 DIAGNOSIS — M25.531 BILATERAL WRIST PAIN: ICD-10-CM

## 2024-09-06 DIAGNOSIS — Z78.9 DECREASED ACTIVITIES OF DAILY LIVING (ADL): ICD-10-CM

## 2024-09-06 NOTE — PROGRESS NOTES
Claw Fist AROM  - 2-3 x daily - 7 x weekly - 1 sets - 10 reps - 5 hold  - Individual Finger Extensions  - 2-3 x daily - 7 x weekly - 1 sets - 10 reps - 5 hold  - Thumb Opposition  - 2-3 x daily - 7 x weekly - 1 sets - 10 reps - 5 hold  - Finger Spreading  - 2-3 x daily - 7 x weekly - 1 sets - 10 reps - 5 hold    Patient Education  - Scar massage and Retrograde Massage  - ASSH Wrist Fracture  - Tubi- size F for swelling    Access Code: [email or web address]  URL: [email or web address]  Date: 06/28/2024  Prepared by: Carl Chandra    Exercises  -  Seated Wrist Prayer Stretch  - 4 x daily - 7 x weekly - 1 sets - 10 reps - 5 hold    Access Code: [email or web address]  URL: [email or web address]  Date: 07/15/2024  Prepared by: Carl Chandra    Exercises  - Forearm Supination Stretch  - 5 x daily - 7 x weekly - 1 sets - 10 reps - 5 hold  - Seated Wrist Flexion with Overpressure  - 5 x daily - 7 x weekly - 1 sets - 10 reps - 5 hold  - Seated Wrist Flexion Stretch  - 5 x daily - 7 x weekly - 1 sets - 10 reps - 5 hold    Access Code: [email or web address]  URL: [email or web address]  Date: 07/17/2024  Prepared by: Carl Chandra    Exercises  - Seated Wrist Extension with 1# Dumbbell  - 3 x daily - 7 x weekly - 1 sets - 10 reps - 5 hold  - Seated Wrist Flexion with 1# Dumbbell  - 3 x daily - 7 x weekly - 1 sets - 10 reps - 5 hold  - Seated Wrist Radial Deviation with 1# Dumbbell  - 3 x daily - 7 x weekly - 1 sets - 10 reps - 5 hold  - Forearm Pronation and Supination with Hammer  - 3 x daily - 7 x weekly - 1 sets - 10 reps - 5 hold      Repeat these 7-8x per day     Weighted stretches with weight for wrist flexion    Reaching for \"grape\" or cotton ball with your thumb and little finger and palm down, rotate palm so palm is facing mouth, then your ear. (For pro/sup)  Don't rotate your head.     Table and prayer stretches    Make a fist, flex and extend wrists    OT Protocols

## 2024-09-09 ENCOUNTER — TREATMENT (OUTPATIENT)
Age: 69
End: 2024-09-09
Payer: COMMERCIAL

## 2024-09-09 DIAGNOSIS — M25.532 BILATERAL WRIST PAIN: ICD-10-CM

## 2024-09-09 DIAGNOSIS — Z78.9 DECREASED ACTIVITIES OF DAILY LIVING (ADL): ICD-10-CM

## 2024-09-09 DIAGNOSIS — M25.632 STIFFNESS OF LEFT WRIST JOINT: ICD-10-CM

## 2024-09-09 DIAGNOSIS — M25.442 EFFUSION OF LEFT HAND: Primary | ICD-10-CM

## 2024-09-09 DIAGNOSIS — M62.81 HAND MUSCLE WEAKNESS: ICD-10-CM

## 2024-09-09 DIAGNOSIS — M25.531 BILATERAL WRIST PAIN: ICD-10-CM

## 2024-09-09 DIAGNOSIS — M25.441 EFFUSION OF RIGHT HAND: ICD-10-CM

## 2024-09-09 DIAGNOSIS — M25.631 STIFFNESS OF RIGHT WRIST JOINT: ICD-10-CM

## 2024-09-09 PROCEDURE — 97110 THERAPEUTIC EXERCISES: CPT | Performed by: OCCUPATIONAL THERAPIST

## 2024-09-09 PROCEDURE — 97763 ORTHC/PROSTC MGMT SBSQ ENC: CPT | Performed by: OCCUPATIONAL THERAPIST

## 2024-10-03 ENCOUNTER — OFFICE VISIT (OUTPATIENT)
Age: 69
End: 2024-10-03
Payer: COMMERCIAL

## 2024-10-03 DIAGNOSIS — S52.572D OTHER CLOSED INTRA-ARTICULAR FRACTURE OF DISTAL END OF LEFT RADIUS WITH ROUTINE HEALING, SUBSEQUENT ENCOUNTER: Primary | ICD-10-CM

## 2024-10-03 DIAGNOSIS — S52.571D OTHER CLOSED INTRA-ARTICULAR FRACTURE OF DISTAL END OF RIGHT RADIUS WITH ROUTINE HEALING, SUBSEQUENT ENCOUNTER: ICD-10-CM

## 2024-10-03 PROCEDURE — 99213 OFFICE O/P EST LOW 20 MIN: CPT | Performed by: ORTHOPAEDIC SURGERY

## 2024-10-03 PROCEDURE — 1123F ACP DISCUSS/DSCN MKR DOCD: CPT | Performed by: ORTHOPAEDIC SURGERY

## 2024-10-03 NOTE — PROGRESS NOTES
Orthopaedic Hand Surgery Note    Name: Anne Marie Solis  Age: 69 y.o.  YOB: 1955  Gender: female  MRN: [id number]    Post Operative Visit: Open Reduction Internal Fixation Of Bilateral Distal Radius Fractures - Bilateral    HPI: Patient is status post Open Reduction Internal Fixation Of Bilateral Distal Radius Fractures - Bilateral on 5/31/2024.  She has completed her course of therapy.  She still has some limitation in range of motion and strength, but has been able to complete most activities of daily living without difficulty.  Physical Examination:  Wounds healed well. Sensation is intact in all fingers. Motor exam reveals no deficits. good finger range of motion. Wrist range of motion is still extremely limited. She has about 40 degrees of wrist flexion and extension on the right. She has about 40 degrees of extension on the left, 40 degrees of flexion. Supination and pronation have improved since her last visit    Imaging:       Assessment:   1. Other closed intra-articular fracture of distal end of left radius with routine healing, subsequent encounter    2. Other closed intra-articular fracture of distal end of right radius with routine healing, subsequent encounter         Status post Open Reduction Internal Fixation Of Bilateral Distal Radius Fractures - Bilateral on 5/31/2024    Plan:  We discussed the treatment and therapy plan.  Her range of motion has improved.  She may continue home exercises for range of motion and strengthening.  I have no activity limitations for her.  I discussed that she may continue to use the gains in range of motion and strength were typically up to a year after the injury. She will follow up as needed    Carla Colón MD  10/03/24  12:02 PM

## 2024-10-29 DIAGNOSIS — I10 ESSENTIAL (PRIMARY) HYPERTENSION: ICD-10-CM

## 2024-10-29 RX ORDER — HYDROCHLOROTHIAZIDE 12.5 MG/1
12.5 TABLET ORAL DAILY
Qty: 90 TABLET | Refills: 3 | Status: SHIPPED | OUTPATIENT
Start: 2024-10-29

## 2024-10-30 RX ORDER — HYDROCHLOROTHIAZIDE 12.5 MG/1
12.5 TABLET ORAL DAILY
Qty: 90 TABLET | Refills: 3 | OUTPATIENT
Start: 2024-10-30

## 2025-05-08 DIAGNOSIS — I10 ESSENTIAL (PRIMARY) HYPERTENSION: ICD-10-CM

## 2025-05-08 DIAGNOSIS — I48.91 ATRIAL FIBRILLATION, UNSPECIFIED TYPE (HCC): ICD-10-CM

## 2025-05-08 DIAGNOSIS — K21.9 GASTROESOPHAGEAL REFLUX DISEASE, UNSPECIFIED WHETHER ESOPHAGITIS PRESENT: ICD-10-CM

## 2025-05-15 DIAGNOSIS — I10 ESSENTIAL (PRIMARY) HYPERTENSION: ICD-10-CM

## 2025-05-15 DIAGNOSIS — K21.9 GASTROESOPHAGEAL REFLUX DISEASE, UNSPECIFIED WHETHER ESOPHAGITIS PRESENT: ICD-10-CM

## 2025-05-15 DIAGNOSIS — I48.91 ATRIAL FIBRILLATION, UNSPECIFIED TYPE (HCC): ICD-10-CM

## 2025-05-15 RX ORDER — APIXABAN 5 MG/1
5 TABLET, FILM COATED ORAL 2 TIMES DAILY
Qty: 180 TABLET | Refills: 3 | OUTPATIENT
Start: 2025-05-15

## 2025-05-15 RX ORDER — AMLODIPINE AND VALSARTAN 10; 160 MG/1; MG/1
1 TABLET ORAL DAILY
Qty: 90 TABLET | Refills: 3 | OUTPATIENT
Start: 2025-05-15

## 2025-05-15 RX ORDER — LANSOPRAZOLE 30 MG/1
30 CAPSULE, DELAYED RELEASE ORAL
Qty: 90 CAPSULE | Refills: 3 | OUTPATIENT
Start: 2025-05-15 | End: 2026-05-10

## 2025-05-15 RX ORDER — METOPROLOL SUCCINATE 100 MG/1
100 TABLET, EXTENDED RELEASE ORAL DAILY
Qty: 90 TABLET | Refills: 3 | OUTPATIENT
Start: 2025-05-15

## 2025-05-15 RX ORDER — HYDROCHLOROTHIAZIDE 12.5 MG/1
12.5 TABLET ORAL DAILY
Qty: 90 TABLET | Refills: 3 | Status: CANCELLED | OUTPATIENT
Start: 2025-05-15

## 2025-05-15 RX ORDER — LANSOPRAZOLE 30 MG/1
30 CAPSULE, DELAYED RELEASE ORAL
Qty: 90 CAPSULE | Refills: 3 | Status: SHIPPED | OUTPATIENT
Start: 2025-05-15 | End: 2026-05-10

## 2025-05-15 RX ORDER — METOPROLOL SUCCINATE 100 MG/1
100 TABLET, EXTENDED RELEASE ORAL DAILY
Qty: 90 TABLET | Refills: 3 | Status: SHIPPED | OUTPATIENT
Start: 2025-05-15

## 2025-05-15 RX ORDER — AMLODIPINE AND VALSARTAN 10; 160 MG/1; MG/1
1 TABLET ORAL DAILY
Qty: 90 TABLET | Refills: 3 | Status: SHIPPED | OUTPATIENT
Start: 2025-05-15

## 2025-05-15 RX ORDER — HYDROCHLOROTHIAZIDE 12.5 MG/1
12.5 TABLET ORAL DAILY
Qty: 90 TABLET | Refills: 3 | OUTPATIENT
Start: 2025-05-15

## (undated) DEVICE — SPLINT THMB W3XL12IN FBRGLS PD PRECUT LTWT DURABLE FAST SET

## (undated) DEVICE — SUTURE MONOCRYL STRATAFIX SPRL + SZ 4-0 L12IN ABSRB UD PS-2 SXMP1B117

## (undated) DEVICE — MASTISOL ADHESIVE LIQ 2/3ML

## (undated) DEVICE — TUBING, SUCTION, 1/4" X 10', STRAIGHT: Brand: MEDLINE

## (undated) DEVICE — DRAPE,U/SHT,SPLIT,FILM,60X84,STERILE: Brand: MEDLINE

## (undated) DEVICE — BIT DRL L40MM DIA2.5MM SLD SIDE CUT FOR GEMINUS VOLAR DST

## (undated) DEVICE — GUIDE SURG DIA1.5MM AIMING FOR GEMINUS VOLAR DST RAD

## (undated) DEVICE — SOLUTION IRRIG 1000ML 0.9% SOD CHL USP POUR PLAS BTL

## (undated) DEVICE — DRIVER SURG UNIV QUIK CONN T10 FOR VOLAR DST RAD PLATING

## (undated) DEVICE — SLING ARM L PCH 9X20IN STRP 2 37IN LT BLU COT FOAM SHLDR PD

## (undated) DEVICE — GLOVE SURG SZ 65 THK91MIL LTX FREE SYN POLYISOPRENE

## (undated) DEVICE — BIT DRL L40MM DIA2MM SLD SIDE CUT FOR GEMINUS VOLAR DST RAD

## (undated) DEVICE — K WIRE FIX L127MM DIA1.5MM DST VOLAR RAD STD TIP GEMINUS
Type: IMPLANTABLE DEVICE | Site: WRIST | Status: NON-FUNCTIONAL
Removed: 2024-05-31

## (undated) DEVICE — SYRINGE MED 10ML LUERLOCK TIP W/O SFTY DISP

## (undated) DEVICE — DISPOSABLE BIPOLAR CODE, 12' (3.66 M): Brand: CONMED

## (undated) DEVICE — C-ARM: Brand: UNBRANDED

## (undated) DEVICE — DRESSING,GAUZE,XEROFORM,CURAD,1"X8",ST: Brand: CURAD

## (undated) DEVICE — DRIVER SURG SQ TIP DIA2MM PEG TORQ LIMITING FOR GEMINUS

## (undated) DEVICE — ZIMMER® STERILE DISPOSABLE TOURNIQUET CUFF WITH PLC, DUAL PORT, SINGLE BLADDER, 18 IN. (46 CM)

## (undated) DEVICE — GLOVE SURG SZ 65 L12IN FNGR THK79MIL GRN LTX FREE

## (undated) DEVICE — STRIP,CLOSURE,WOUND,MEDI-STRIP,1/2X4: Brand: MEDLINE

## (undated) DEVICE — PADDING CAST W3INXL4YD COT BLEND MIC PLEAT UNDERCAST SPEC

## (undated) DEVICE — BNDG,ELSTC,MATRIX,STRL,2"X5YD,LF,HOOK&LP: Brand: MEDLINE

## (undated) DEVICE — HAND PACK: Brand: MEDLINE INDUSTRIES, INC.

## (undated) DEVICE — SUTURE VICRYL + 3-0 L27IN ABSRB UD PS-2 L19MM 3/8 CIR PRIM VCP427H

## (undated) DEVICE — SYRINGE IRRIG 60ML SFT PLIABLE BLB EZ TO GRP 1 HND USE W/